# Patient Record
Sex: FEMALE | Race: WHITE | NOT HISPANIC OR LATINO | Employment: OTHER | URBAN - METROPOLITAN AREA
[De-identification: names, ages, dates, MRNs, and addresses within clinical notes are randomized per-mention and may not be internally consistent; named-entity substitution may affect disease eponyms.]

---

## 2017-01-17 ENCOUNTER — ALLSCRIPTS OFFICE VISIT (OUTPATIENT)
Dept: OTHER | Facility: OTHER | Age: 72
End: 2017-01-17

## 2017-01-17 DIAGNOSIS — G31.84 MILD COGNITIVE IMPAIRMENT: ICD-10-CM

## 2017-01-18 ENCOUNTER — TRANSCRIBE ORDERS (OUTPATIENT)
Dept: ADMINISTRATIVE | Facility: HOSPITAL | Age: 72
End: 2017-01-18

## 2017-01-18 DIAGNOSIS — G70.00 MYASTHENIA GRAVIS (HCC): Primary | ICD-10-CM

## 2017-02-08 ENCOUNTER — GENERIC CONVERSION - ENCOUNTER (OUTPATIENT)
Dept: OTHER | Facility: OTHER | Age: 72
End: 2017-02-08

## 2017-02-08 ENCOUNTER — HOSPITAL ENCOUNTER (OUTPATIENT)
Dept: NEUROLOGY | Facility: CLINIC | Age: 72
Discharge: HOME | End: 2017-02-08
Payer: MEDICARE

## 2017-02-08 DIAGNOSIS — G70.00 MYASTHENIA GRAVIS (HCC): ICD-10-CM

## 2017-02-08 PROCEDURE — 95816 EEG AWAKE AND DROWSY: CPT

## 2017-02-27 ENCOUNTER — ALLSCRIPTS OFFICE VISIT (OUTPATIENT)
Dept: OTHER | Facility: OTHER | Age: 72
End: 2017-02-27

## 2017-04-13 ENCOUNTER — ALLSCRIPTS OFFICE VISIT (OUTPATIENT)
Dept: OTHER | Facility: OTHER | Age: 72
End: 2017-04-13

## 2018-01-13 VITALS
HEIGHT: 66 IN | BODY MASS INDEX: 20.48 KG/M2 | WEIGHT: 127.44 LBS | DIASTOLIC BLOOD PRESSURE: 70 MMHG | RESPIRATION RATE: 16 BRPM | HEART RATE: 77 BPM | SYSTOLIC BLOOD PRESSURE: 126 MMHG

## 2018-01-13 NOTE — PROCEDURES
Procedures by Key Kowalski MD at 2017   1:36 PM      Author:  Key Kowalski MD Service:  Neurology Author Type:  Physician     Filed:  2017  1:44 PM Date of Service:  2017  1:36 PM Status:  Signed     :  Key Kowalski MD (Physician)            ELECTROENCEPHALOGRAM (EEG)      Patient Name:  Argenis Palencia  MRN: [id number]   :  1945 File #: IHC    Age: 66 y o  Encounter #: [id number]   Date performed: 2017            Report date: 2017          Study type: Routine EEG    ICD 10 diagnosis: Other Epilepsy G40 909    Start time: 09:44 End time: 10:16     -------------------------------------------------------------------------------------------------------------------   Patient History: This recording was observed in a 66 y o  female with history of epilepsy  to better characterize epilepsy  Medications include: Keppra    -------------------------------------------------------------------------------------------------------------------   Description of Procedure:  ·  32 channel digital recording with electrodes placed according to the International 10-20 system with additional  T1/T2 electrodes, EOG, EKG, and simultaneous  video  The recording was technically satisfactory  -------------------------------------------------------------------------------------------------------------------   EEG Description:    The recording was performed with the patient awake and drowsy  She was oriented to  self, , and president  During wakefulness, there were long runs of well regulated, low amplitude, posteriorly  dominant, symmetric 8-9 cps alpha rhythm that attenuated with eye opening  There were symmetric low amplitude, frontally dominant beta activities  There were sporadic left temporal delta activities  With drowsiness, alpha activity slowed and there were diffusely distributed theta activities  Deeper sleep was not attained  -------------------------------------------------------------------------------------------------------------------   Activation Procedures:  Hyperventilation was not performed     Stepped photic stimulation between 1-20 cps was performed and induced symmetric  photic driving  Other findings: The single lead ECG demonstrated a regular rhythm     -------------------------------------------------------------------------------------------------------------------   EEG Interpretation: This Routine EEG recorded during wakefulness and drowsiness is abnormal   Sporadic left temporal delta activities suggests an area of underlying neuronal dysfunction  Anayeli Shine MD   [id number] [facility]               Received for:Kimani TAYLOR    Feb 8 2017  1:45PM [facility] Standard Time

## 2018-01-14 VITALS
WEIGHT: 125.44 LBS | HEIGHT: 66 IN | DIASTOLIC BLOOD PRESSURE: 74 MMHG | HEART RATE: 88 BPM | RESPIRATION RATE: 16 BRPM | SYSTOLIC BLOOD PRESSURE: 155 MMHG | BODY MASS INDEX: 20.16 KG/M2

## 2018-01-15 VITALS — DIASTOLIC BLOOD PRESSURE: 66 MMHG | SYSTOLIC BLOOD PRESSURE: 134 MMHG | HEART RATE: 79 BPM | RESPIRATION RATE: 16 BRPM

## 2018-03-15 ENCOUNTER — OFFICE VISIT (OUTPATIENT)
Dept: NEUROLOGY | Facility: CLINIC | Age: 73
End: 2018-03-15
Payer: MEDICARE

## 2018-03-15 VITALS — SYSTOLIC BLOOD PRESSURE: 133 MMHG | HEART RATE: 73 BPM | DIASTOLIC BLOOD PRESSURE: 75 MMHG | RESPIRATION RATE: 14 BRPM

## 2018-03-15 DIAGNOSIS — G70.00 MYASTHENIA GRAVIS (HCC): Primary | ICD-10-CM

## 2018-03-15 PROCEDURE — 99214 OFFICE O/P EST MOD 30 MIN: CPT | Performed by: PSYCHIATRY & NEUROLOGY

## 2018-03-15 RX ORDER — THIAMINE MONONITRATE (VIT B1) 100 MG
1 TABLET ORAL DAILY
COMMUNITY
Start: 2017-02-27

## 2018-03-15 RX ORDER — ACETAMINOPHEN 650 MG/1
1 SUPPOSITORY RECTAL AS NEEDED
COMMUNITY

## 2018-03-15 RX ORDER — DIPHENHYDRAMINE HYDROCHLORIDE 25 MG/1
1 CAPSULE ORAL DAILY
COMMUNITY
Start: 2017-02-27

## 2018-03-15 RX ORDER — SODIUM PHOSPHATE,MONO-DIBASIC 19G-7G/118
ENEMA (ML) RECTAL
COMMUNITY

## 2018-03-15 NOTE — PROGRESS NOTES
Progress Note - Neurology   Fiona Eddy 74 y o  female MRN: [id number]      Assessment/Plan     Problem List Items Addressed This Visit     Myasthenia gravis [facility]) - Primary          Assessment:  Myasthenia gravis  Seizure disorder  Plan:  Myasthenia gravis:  Stable at this time  Patient has minimal symptoms  Exam appears to be normal and no ptosis on sustained upgaze  Recommend continuing with Mestinon 60 mg half tablet 3 times daily  Patient tolerating the medication well  History of seizures:  Stable patient denies any seizures at this time  Patient is following up with Dr Momo Pantoja and is on [address]  Will follow up the patient in 1 year  Reason for Follow up: For myasthenia gravis  HPI: Fiona Eddy is a 74 y o  female who is here for a follow up  Patient was last seen in April 2017 and is here for annual follow-up    Patient states that her myasthenia has been stable  She denies any ocular complaints at this time except mild ptosis occasionally towards the end of the day  She denies any diplopia or ptosis  She denies any dysphagia, shortness of breath, dysarthria  She is on Mestinon 60 mg half tablet 3 times daily  Patient is following up with Dr Momo Pantoja in regards to her seizures  She denies any further episodes of seizures and is stable on Keppra  Review of Systems   Constitutional: Positive for appetite change  Negative for fever  HENT: Positive for hearing loss  Negative for tinnitus, trouble swallowing and voice change  Eyes: Negative  Negative for photophobia and pain  Respiratory: Positive for shortness of breath  Cardiovascular: Negative  Negative for palpitations  Gastrointestinal: Negative  Negative for nausea and vomiting  Endocrine: Negative  Negative for cold intolerance and heat intolerance  Genitourinary: Negative  Negative for dysuria, frequency and urgency  Musculoskeletal: Negative  Negative for myalgias and neck pain  Skin: Negative    Negative for rash  Neurological: Positive for headaches  Negative for dizziness, tremors, seizures, syncope, facial asymmetry, speech difficulty, weakness, light-headedness and numbness  Hematological: Negative  Does not bruise/bleed easily  Psychiatric/Behavioral: Negative  Negative for confusion, hallucinations and sleep disturbance  Historical Information   Past Medical History:   Diagnosis Date    Atrial septal defect     [facility]ch[facility]) ([facility]     [facility]cerebral vascular accident) ([facility] )     Epilepsy ([place] [address] )     History of DVT (deep vein thrombosis)     Hyperlipidemia     Hyperlipidemia     Hypertension      Past Surgical History:   Procedure Laterality Date    HYSTERECTOMY      VARICOSE VEIN SURGERY       Social History   History   Alcohol Use No     History   Drug Use No     History   Smoking Status    Current Every Day Smoker    Packs/day: 0 25    Years: 40 00   Smokeless Tobacco    Never Used     No family history on file  Meds/Allergies     Current Outpatient Prescriptions:     acetaminophen (TYLENOL) 325 mg tablet, Take 650 mg by mouth every 6 (six) hours as needed for mild pain , Disp: , Rfl:     acetaminophen (TYLENOL) 650 mg suppository, Insert 1 suppository into the rectum as needed, Disp: , Rfl:     benzocaine-menthol (CEPACOL SORE THROAT) 15-3 6 mg per lozenge, Apply 1 lozenge to the mouth or throat every 4 (four) hours, Disp: , Rfl:     bisacodyl (FLEET) 10 MG/30ML ENEM, Insert 10 mg into the rectum once, Disp: , Rfl:     Difluprednate (DUREZOL) 0 05 % EMUL, Administer 1 drop to the right eye 3 (three) times a day, Disp: , Rfl:     docusate sodium (COLACE) 100 mg capsule, Take 100 mg by mouth daily  , Disp: , Rfl:     ferrous sulfate 325 (65 Fe) mg tablet, Take 325 mg by mouth daily at bedtime, Disp: , Rfl:     fluticasone-salmeterol (ADVAIR) 250-50 mcg/dose inhaler, Inhale 1 puff every 12 (twelve) hours, Disp: , Rfl:     folic acid (FOLVITE) 1 mg tablet, Take 1 mg by mouth daily, Disp: , Rfl:     levETIRAcetam (KEPPRA) 750 mg tablet, Take 750 mg by mouth daily  , Disp: , Rfl:     lisinopril (ZESTRIL) 5 mg tablet, Take 5 mg by mouth daily  , Disp: , Rfl:     magnesium hydroxide (MILK OF MAGNESIA) 800 MG/5ML suspension, Take 30 mL by mouth daily as needed for constipation, Disp: , Rfl:     Mirabegron ER (MYRBETRIQ) 25 MG TB24, Take by mouth daily  , Disp: , Rfl:     Nepafenac (ILEVRO) 0 3 % SUSP, Apply to eye, Disp: , Rfl:     pravastatin (PRAVACHOL) 10 mg tablet, Take 10 mg by mouth daily  , Disp: , Rfl:     pyridostigmine (MESTINON) 60 mg tablet, Take 30 mg by mouth 3 (three) times a day , Disp: , Rfl:     Pyridoxine HCl (VITAMIN B-6) 25 MG tablet, Take 1 tablet by mouth daily, Disp: , Rfl:     sodium phosphate (FLEET) enema, Insert 1 enema into the rectum once follow package directions, Disp: , Rfl:     Sodium Phosphates ([employer] ENEMA DISPOSABLE) 19-7 GM/118ML ENEM, Insert into the rectum, Disp: , Rfl:     thiamine (VITAMIN B1) 100 mg tablet, Take 1 tablet by mouth daily, Disp: , Rfl:     tiotropium (SPIRIVA) 18 mcg inhalation capsule, Place 18 mcg into inhaler and inhale daily  , Disp: , Rfl:     warfarin (COUMADIN) 2 mg tablet, Take 2 mg by mouth daily Patient takes 1 tablet at bedtime every Sun, Tue, Thursday, Saturday , Disp: , Rfl:     warfarin (COUMADIN) 3 mg tablet, Take 3 mg by mouth daily Patient takes 1 tablet orally at bedtime every Mon, Wed, Friday, Disp: , Rfl:     No Known Allergies    Objective   Vitals:Blood pressure 133/75, pulse 73, resp  rate 14  ,There is no height or weight on file to calculate BMI  General examination:  Patient is awake and alert  Eyes: Conjunctiva and sclera are clear  HEENT: External examination is normal  Neck: Supple  Lungs: Clear to auscultation bilaterally  CVS: S1, S2 heard  Abdomen: Soft, nontender  Extremities: No clubbing, edema, cyanosis  Skin: No rashes       Neurological examination: Mental status: Patient is awake, alert, oriented to time place and person  Attention, concentration, fund of knowledge is intact  Language: No evidence of aphasia or dysarthria  Memory: Repetition 3/3 and recall 3/3  Cranial nerves: Pupils equal, reacting to light and accommodation  Extraocular movements intact  No ptosis noted on sustained upgaze bilaterally  Visual fields are full  Fundi are difficult to visualize bilaterally  Facial sensation is intact  No facial weakness is noted  Finger rub test is intact bilaterally  Tongue and uvula are in midline  Gag is intact  Shoulder shrug is 5/5 bilaterally  Motor examination: Tone is normal  No atrophy noted  Strength is 5/5 throughout  Sensory examination: Light touch is intact  Pinprick, temperature are intact  Vibration is slightly decreased at the level of the toes bilaterally  Proprioception is intact  Deep tendon reflexes: 1 throughout  Plantars are downgoing bilaterally  Coordination: Finger-nose test and finger tapping intact bilaterally  Heel-to-shin test is intact bilaterally  Gait: Patient is in a wheelchair and gait is deferred at this time  Lab Results:   Labs in March 2018 showed INR 1 4, PT 16 5, glucose 51, creatinine 0 93, ALT 12, AST 11, LDL cholesterol 63  Imaging Studies: I have personally reviewed pertinent reports        Counseling / Coordination of Care  I spent 25 minutes with the patient and greater than 50% of the time was spent in coordination of care and counselling

## 2018-03-27 ENCOUNTER — TELEPHONE (OUTPATIENT)
Dept: NEUROLOGY | Facility: CLINIC | Age: 73
End: 2018-03-27

## 2018-03-27 NOTE — TELEPHONE ENCOUNTER
Patient's appointment was rescheduled for 7/9//18 at [address] [place] [address] in [facility]  The facility's scheduling team was offered [facility] for a sooner appointment date but was denied due to distance  They only wanted an appointment in [facility]

## 2018-03-27 NOTE — TELEPHONE ENCOUNTER
Called patient to reschedule her 4/13/18 60 minute appointment per Dr Alma Martini time  Left message advising patient to call back to reschedule

## 2018-07-09 ENCOUNTER — OFFICE VISIT (OUTPATIENT)
Dept: NEUROLOGY | Facility: CLINIC | Age: 73
End: 2018-07-09
Payer: MEDICARE

## 2018-07-09 VITALS
SYSTOLIC BLOOD PRESSURE: 124 MMHG | HEART RATE: 64 BPM | RESPIRATION RATE: 16 BRPM | HEIGHT: 66 IN | BODY MASS INDEX: 19.16 KG/M2 | WEIGHT: 119.2 LBS | DIASTOLIC BLOOD PRESSURE: 69 MMHG

## 2018-07-09 DIAGNOSIS — E51.9 THIAMINE DEFICIENCY: ICD-10-CM

## 2018-07-09 DIAGNOSIS — E44.0 PROTEIN-CALORIE MALNUTRITION, MODERATE (HCC): Chronic | ICD-10-CM

## 2018-07-09 DIAGNOSIS — G31.84 MILD COGNITIVE IMPAIRMENT WITH MEMORY LOSS: ICD-10-CM

## 2018-07-09 DIAGNOSIS — E53.1 VITAMIN B6 DEFICIENCY: ICD-10-CM

## 2018-07-09 DIAGNOSIS — H02.403 PTOSIS OF BOTH EYELIDS: ICD-10-CM

## 2018-07-09 DIAGNOSIS — G70.00 MYASTHENIA GRAVIS, ACHR ANTIBODY POSITIVE (HCC): ICD-10-CM

## 2018-07-09 DIAGNOSIS — G40.209 PARTIAL SYMPTOMATIC EPILEPSY WITH COMPLEX PARTIAL SEIZURES, NOT INTRACTABLE, WITHOUT STATUS EPILEPTICUS (HCC): Primary | ICD-10-CM

## 2018-07-09 PROCEDURE — 99215 OFFICE O/P EST HI 40 MIN: CPT | Performed by: PSYCHIATRY & NEUROLOGY

## 2018-07-09 RX ORDER — BUDESONIDE AND FORMOTEROL FUMARATE DIHYDRATE 160; 4.5 UG/1; UG/1
2 AEROSOL RESPIRATORY (INHALATION) 2 TIMES DAILY
COMMUNITY

## 2018-07-09 RX ORDER — LEVETIRACETAM 750 MG/1
750 TABLET ORAL EVERY 12 HOURS
Qty: 180 TABLET | Refills: 3 | Status: SHIPPED | OUTPATIENT
Start: 2018-07-09

## 2018-07-09 RX ORDER — IBUPROFEN 200 MG
TABLET ORAL 4 TIMES DAILY
COMMUNITY

## 2018-07-09 RX ORDER — PYRIDOSTIGMINE BROMIDE 60 MG/1
30 TABLET ORAL 3 TIMES DAILY
Qty: 135 TABLET | Refills: 3 | Status: SHIPPED | OUTPATIENT
Start: 2018-07-09

## 2018-07-09 NOTE — PROGRESS NOTES
Review of Systems    {LimROS-complete:92927}      Review of Systems   Constitutional: Positive for appetite change  HENT: Negative  Eyes: Negative  Respiratory: Negative  Cardiovascular: Negative  Gastrointestinal: Negative  Genitourinary: Negative  Musculoskeletal: Negative  Skin: Negative  Hematological: Bruises/bleeds easily  Psychiatric/Behavioral: Positive for dysphoric mood and sleep disturbance

## 2018-07-09 NOTE — PATIENT INSTRUCTIONS
PLAN:  Epilepsy  1 - continue with Levetiracetam 750mg twice a day  Given that she has been seizure free for quite some time now, should she have a seizure / convulsion she should be taken to the emergency department for immediate assessment (given the risk of intracranial hemorrhage on anticoagulation and historically difficulty keeping her INR in a specific therapeutic range)    Ocular Myasthesia Gravis  2 - continue with pyridostigmine 60mg tab take half a tab 3 times a day  3 - call the office if there is progression of double vision, ptosis (droopy eye lids), muscle fatigue at the end of the day    Memory Impairment  4 - re-enforce the use of hearing aids  5 - call the office for re-assessment if memory impairment is worse    Protein Malnutrition - vitamin deficiency  6 - please have the patient evaluated by dietician for caloric-protein needs, may need supplemental nutritional drinks  7 - continue with thiamine and B6 supplements  8 - check thiamine and B6 levels    History of prior multiple strokes  I recommend that she continue with anticoagulation and secondary stroke risk modification  Based on her blood tests she does not have diabetes as a risk factor, she is on a statin with low LDL levels, and her BP goals should be <130/90  I reviewed signs of a stroke including acute onset facial asymmetry, limb weakness or incoordination, speech deficit/difficulty, or loss of vision and she must immediately notify EMS / nursing staff  She has some degree of cognitive impairment related to her memory domain, currently stable will continue to monitor

## 2018-07-09 NOTE — PROGRESS NOTES
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  She is tolerating her levetiracetam without significant side effects  She has not had complications from her myasthenia gravis  Her most recent LDL is less than 70 and BP have been less than 130/90  She has no prior history of diabetes  She has some mild cognitive impairment, mostly related to memory domain along with poor hearing  She appears to be functioning well without progression of neurocognitive disorder      1 - Epilepsy - due to the high risk of seizure recurrence with an abnormal EEG and h/o strokes and she is on anticoagulation, I recommend that she continue levetiracetam monotherapy as she does not have any side effects from this medication, and it is less likely to interact with multiple other medications       2 - multiple strokes  I recommend that she continue with anticoagulation and secondary stroke risk modification  Based on her blood tests she does not have diabetes as a risk factor, she is on a statin with low LDL levels, and her BP goals should be <130/90  I reviewed signs of a stroke including acute onset facial asymmetry, limb weakness or incoordination, speech deficit/difficulty, or loss of vision and she must immediately notify EMS / nursing staff  She has some degree of cognitive impairment related to her memory domain, currently stable will continue to monitor      3 - Ocular myasthenia gravis is stable with low dose of Mestinon      4 - vitamin B1 and B6 deficiency, continue supplementation; but would recheck levels    Plan:   Epilepsy  1 - continue with Levetiracetam 750mg twice a day  Given that she has been seizure free for quite some time now, should she have a seizure / convulsion she should be taken to the emergency department for immediate assessment (given the risk of intracranial hemorrhage on anticoagulation and historically difficulty keeping her INR in a specific therapeutic range)    Ocular Myasthesia Gravis  2 - continue with pyridostigmine 60mg tab take half a tab 3 times a day  3 - call the office if there is progression of double vision, ptosis (droopy eye lids), muscle fatigue at the end of the day    Memory Impairment  4 - re-enforce the use of hearing aids  5 - call the office for re-assessment if memory impairment is worse    Protein Malnutrition - vitamin deficiency  6 - please have the patient evaluated by dietician for caloric-protein needs, may need supplemental nutritional drinks  7 - continue with thiamine and B6 supplements  8 - check thiamine and B6 levels    History of prior multiple strokes  I recommend that she continue with anticoagulation and secondary stroke risk modification  Based on her blood tests she does not have diabetes as a risk factor, she is on a statin with low LDL levels, and her BP goals should be <130/90  I reviewed signs of a stroke including acute onset facial asymmetry, limb weakness or incoordination, speech deficit/difficulty, or loss of vision and she must immediately notify EMS / nursing staff  She has some degree of cognitive impairment related to her memory domain, currently stable will continue to monitor        Problem List Items Addressed This Visit        Nervous and Auditory    Epilepsy ([facility] [address] ) - Primary    Relevant Medications    levETIRAcetam (KEPPRA) 750 mg tablet    Myasthenia gravis, AChR antibody positive (HCC)    Relevant Medications    pyridostigmine (MESTINON) 60 mg tablet    Mild cognitive impairment with memory loss    Relevant Orders    Vitamin B1, whole blood    Vitamin B6       Other    Protein-calorie malnutrition, moderate (HCC) (Chronic)    Relevant Orders    Vitamin B1, whole blood    Vitamin B6    Ptosis of eyelid      Other Visit Diagnoses     Thiamine deficiency        Vitamin B6 deficiency                Chief Complaint:    Chief Complaint   Patient presents with    Seizures     Patient present for neurology follow up regarding epilepsy  HPI:    Humberto Florez is a 74 y o  right handed female here for follow-up evaluation of suspected epilepsy and ocular myasthenia gravis  Interval History 7/9/2018  Ms Akhil Borrego was previously seen by Dr Lauren Barragan for ocular myasthenia gravis  She is still on Mestinon 30mg three times a day  (There was mild ptosis, but no diplopia )  She is also on thiamine and pyridoxine supplementation due to low vitamin levels or deficiencies that was detected last year  Ms Akhil Borrego denies having had recurrent episodes of disorientation, confusion, or periods of unresponsiveness  She denies recurrent double vision, droopy eyelids, difficulty chewing food, difficulty breathing, or feeling muscle weakness  She is able to walk to the bathroom  Most of the time she uses wheelchair, due to oxygen dependency  She is not aware of memory difficulty; but her staff member commented that she repeats herself frequently  I called [employer] and spoke with Sanna Estrella, the unit nurse  There have been no unusual behavior, periods of confusion, unresponsiveness, or seizure-like activity  Ms Akhil Borrego has been complaining of sacral pain, there is no open wound; she was given a different cushion for her wheelchair but she does not like to use it  She is very stubborn  There have been no complaints of forgetfulness or memory difficulty    She is very with it, she is able to recall when she received medical treatments, she is able to self care, manages her own oxygen recognizing when the oxygen tank is low  She is currently on thiamine 100mg daily and pyridoxine 25mg daily (low vitamin levels were detected last year)  AED/side effects/compliance:  Levetiracetam 750mg twice a day  Regulated  Intermittent irritability (alert and pleasant and tearful) a couple of times a week, poor relationship with roommate  Intermittent depressed mood due to complex family dynamics  Seizure semiology:  1  Staring, possibly some shaking, amnestic/feels frozen      Prior Epilepsy History:  Intake history September 2014  She believes that her first seizure occurred about 1-3 years ago when she went to see her daughter in [place] (H+P from [facility] admission suggests June 2012)  She recalled getting up to go to the bathroom and then had to hold on to the railing going down stairs, she called out for help as she was holding on to the railing, She was frozen unable to do anything, she denies loss of consciousness  She appeared to just stare out; she was not able to hear, unable to talk  She cannot remember the event  She feels that she lost about 30 minutes to 60 minutes  She was told that it may have been a TIA, they found that she has a PFO  She was given medications (unclear what for)  In July 2012, she was at her [address] [address] office when she started to have a staring event, complained of leg weakness and lethargy and then started having some shaking movements  She was sent to the ED for evaluation  She had an MRI/MRA study that showed acute infarct within the frontal lobes and the right cerebellum, along with an old left cerebellar infarct and small vessel disease  She was subsequently started on Coumadin due to the PFO and she had declined surgical closure  In January 2013, she presented to hospital with feeling weak, not herself, and fell in the bedroom    When being evaluated by neurology consult she appeared to have had a complex partial seizure (I am unable to interpret what was written on the consultation note other than the impression that it was a complex partial seizure)  She had an EEG that showed diffuse background slowing and left temporal focal slowing with sharp waves  She was started on Keppra  She had an MRI of the brain that did not show a new infarct  Her next admission was in July 2014 when she presented to the ED for a right eye ptosis  MRI/MRA did not reveal an aneurysm or new stroke  She had reported prior seizures but she does not recall the event type  She was also tested for Acetylcholine receptor antibodies which has come back positive  Unclear frequency of seizures, but she has been on levetiracetam 750mg twice a day  She is unable to tell me her last seizure  She has been in and out of [address] since 2012  There has not been any report of seizures since she has been [address]  She tells me that her right eye drooping started in 7/2014  There is no prior history of drooping of the left eye  She denies problems with chewing food, swallowing food, no late day weakness or progressive weakness of the hands/fingers  She denies experiencing episodic diplopia  She does perform light duty chores without progressive weakness  She has COPD and requires oxygen when she is ambulating  Summary [id number]  She has been seen by Dr Yimi Arias for ocular sero-positive myasthenia gravis for the past year  She was started on Mestinon 30mg three times a day in January 2016 (no evidence of thymoma, not on immunosuppressant at this time)  The last time she was seen by me was in September 2014  She denies fatigable weakness, dysarthria, dysphagia, shortness of breath, or progressive weakness towards the end of the day  She denies having had loss of consciousness, altered awareness, convulsion, psychic phenomenon  She denies depression, mood swings, or crying spells       She generally will ambulate with the support of her wheelchair (due to oxygen dependency) but without assistance        Special Features  Status epilepticus: No  Self Injury Seizures: No  Precipitating Factors: None    Epilepsy Risk Factors:  Abnormal pregnancy: No  Abnormal birth/: No  Abnormal Development: No  Febrile seizures, simple: No  Febrile seizures, complex: No  CNS infection: No  Mental retardation: No  Cerebral palsy: No  Head injury (moderate/severe): No  CNS neoplasm: No  CNS malformation: No  Neurosurgical procedure: No  Stroke: No  Alcohol abuse: No  Drug abuse: No  Family history Sz/epilepsy: No    Prior AEDs:  medication Max dose Time used Reason to stop   levetiracetam              Contacts:  Daughter Jan Greene (may have witnessed a seizure - [phone], [phone]), daughter Maryam Christy [phone] (not witness to any seizure)    Prior workup:  x   Imaging:  MRI brain 2014  Moderate chronic microangiopathic changes involve the subcortical and deep white matter of the frontal and parietal lobes bilaterally  Chronic bilateral posterior cerebellar cortical and subcortical infarcts are present  No acute ischemic disease is identified  MRI of the orbits are unremarkable  MRA  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 with distribution corresponding to current MRI exam for chronic bilateral cerebellar infarctions  No aneurysm is present    EEGs:  EEG 2013 interpreted by Dr Cordon  6-7 Hz posterior rhythm intermittent left temporal slowing rare left temporal sharp waves maximal over T3  Impression:  Mild diffuse cerebral dysfunction with left temporal structural abnormality questionable epileptiform potential    2017  Routine  Left temporal delta activity    Labs:  2017  B1 <2  B6 12 2  B12 512  Folate >17 5  SPEP normal pattern    3/2/2018  CBC 8 3/10 /309  /4 2/105/26/16/0 9/51  LFT 7 0/3 0/0 ///104  LDLc 63  Cholesterol 142  Triglyceride 44    General exam   /69 (BP Location: Right arm, Patient Position: Sitting, Cuff Size: Standard)   Pulse 64   Resp 16   Ht 5' 6" (1 676 m)   Wt 54 1 kg (119 lb 3 2 oz)   BMI 19 24 kg/m²    Appearance: she is alert and no acute distress; she has an oxygen nasal cannula  Carotids: n/a  Cardiovascular: regular rate and rhythm and normal heart sounds  Pulmonary: clear on inhalation and wheezing is present on exhaling  Extremities: no edema    HEENT: there is wasting of the temporalis muscle and inset eyes (likely due to decrease in kassandra-orbital fat pads), anicteric and moist mucus membranes / oral cavity   Fundoscopy: not assessed    Mental status  Orientation: alert and oriented to name, unable to give month, day, day of week, year, [facility], [facility] Knowledge: she is evasive with her answers (she does not pay attention to the news), but she is aware of what is happening at [place] won the superbowl; [place], unable to give much information regarding the news; Attention and Concentration: able to spell HOUSE forwards and backwards  Current and Remote Memory:recalled 0/3 words after five minutes; she has difficulty encoding 3 words for memory testing     Language: she has difficulty with repetition in part due to poor hearing, no aphasia, spontaneous speech is normal and comprehension is intact    Cranial Nerves  CN 1: not tested  CN 2: pupils equal round reactive to direct and consenual light   CN 3, 4, 6: EOMI, no nystagmus  CN 5:not assessed  CN 7:muscles of facial expression are symmetric  CN 8:patient is unable to hear finger rubs; she frequently looks to her staff and aske "what did he say" and her staff member would have to repeat what I said  CN 9, 10:no dysarthria present  CN 11:symmetric strength of sternocleidomastoid and trapezius muscles  CN 12:tongue is midline    Motor:  Bulk, Tone: normal bulk, normal tone  Pronation: no pronator drift  Strength: symmetric strength of the arms and legs; but she is sensitive tot he left arm, which is sore from an open wound; she had a bandage for blood work, but she pulled the band-aid off and it caused a skin tear  Sensory:  Lighttouch: intact in all limbs  Vibration: intact in all limbs  Romberg:normal    Coordination:  FNF:FNF bilaterally intact  DAMARIS:incoordinated finger movements of the left and incoordinated finger movemetns of the right  FFM:slow finger taps on the left and slow finger taps on the right  Gait/Station:normal gait    Reflexes:  Right knee reflex is 2+/4, left knee is 1+/4, unable to elicit ankle reflexes      Past Medical/Surgical History:  Patient Active Problem List   Diagnosis    Hypertension    Hypercholesterolemia    Epilepsy ([place] [address] )    [facility]    Atrial septal defect    History of DVT (deep vein thrombosis)    CVA (cerebral vascular accident) ([place] [address] )    Cellulitis and abscess    COPD (chronic obstructive pulmonary disease) ([facility])    Chronic respiratory failure with hypoxia ([facility])    Protein-calorie malnutrition, moderate ([facility])    Myasthenia gravis, AChR antibody positive ([facility])    Cataract    Depression    Venous insufficiency    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[facility])    Ptosis of eyelid    Patent foramen ovale    Onychomycosis of toenail    Mild cognitive impairment with memory loss    Headache    GERD (gastroesophageal reflux disease)     Past Medical History:   Diagnosis Date    Atrial septal defect     COPD (chronic obstructive pulmonary disease) ([facility])     CVA (cerebral vascular accident) ([place] [address] )     Epilepsy ([place] [address] )     History of DVT (deep vein thrombosis)     Hyperlipidemia     Hyperlipidemia     Hypertension      Past Surgical History:   Procedure Laterality Date    HYSTERECTOMY      VARICOSE VEIN SURGERY         Past Psychiatric History:  Depression: No  Anxiety: No  Psychosis: No    Medications:    Current Outpatient Prescriptions:     acetaminophen (TYLENOL) 325 mg tablet, Take 650 mg by mouth every 6 (six) hours as needed for mild pain , Disp: , Rfl:     acetaminophen (TYLENOL) 650 mg suppository, Insert 1 suppository into the rectum as needed, Disp: , Rfl:     benzocaine-menthol (CEPACOL SORE THROAT) 15-3 6 mg per lozenge, Apply 1 lozenge to the mouth or throat every 4 (four) hours, Disp: , Rfl:     bisacodyl (FLEET) 10 MG/30ML ENEM, Insert 10 mg into the rectum once, Disp: , Rfl:     budesonide-formoterol (SYMBICORT) 160-4 5 mcg/act inhaler, Inhale 2 puffs 2 (two) times a day Rinse mouth after use , Disp: , Rfl:     Difluprednate (DUREZOL) 0 05 % EMUL, Administer 1 drop to the right eye 3 (three) times a day, Disp: , Rfl:     ferrous sulfate 325 (65 Fe) mg tablet, Take 325 mg by mouth daily at bedtime, Disp: , Rfl:     fluticasone-salmeterol (ADVAIR) 250-50 mcg/dose inhaler, Inhale 1 puff every 12 (twelve) hours, Disp: , Rfl:     folic acid (FOLVITE) 1 mg tablet, Take 1 mg by mouth daily, Disp: , Rfl:     levETIRAcetam (KEPPRA) 750 mg tablet, Take 1 tablet (750 mg total) by mouth every 12 (twelve) hours, Disp: 180 tablet, Rfl: 3    lisinopril (ZESTRIL) 5 mg tablet, Take 5 mg by mouth daily  , Disp: , Rfl:     magnesium hydroxide (MILK OF MAGNESIA) 800 MG/5ML suspension, Take 30 mL by mouth daily as needed for constipation, Disp: , Rfl:     Mirabegron ER (MYRBETRIQ) 25 MG TB24, Take by mouth daily  , Disp: , Rfl:     neomycin-bacitracin-polymyxin (NEOSPORIN) 5-400-5,000 ointment, Apply topically 4 (four) times a day, Disp: , Rfl:     Nepafenac (ILEVRO) 0 3 % SUSP, Apply to eye, Disp: , Rfl:     pravastatin (PRAVACHOL) 10 mg tablet, Take 10 mg by mouth daily  , Disp: , Rfl:     pyridostigmine (MESTINON) 60 mg tablet, Take 0 5 tablets (30 mg total) by mouth 3 (three) times a day, Disp: 135 tablet, Rfl: 3    Pyridoxine HCl (VITAMIN B-6) 25 MG tablet, Take 1 tablet by mouth daily, Disp: , Rfl:     sodium phosphate (FLEET) enema, Insert 1 enema into the rectum once follow package directions, Disp: , Rfl:     Sodium Phosphates ([employer] ENEMA DISPOSABLE) 19-7 GM/118ML ENEM, Insert into the rectum, Disp: , Rfl:     thiamine (VITAMIN B1) 100 mg tablet, Take 1 tablet by mouth daily, Disp: , Rfl:     tiotropium (SPIRIVA) 18 mcg inhalation capsule, Place 18 mcg into inhaler and inhale daily  , Disp: , Rfl:     warfarin (COUMADIN) 2 mg tablet, Take 2 mg by mouth daily Patient takes 1 tablet at bedtime every Sun, Tue, Thursday, Saturday , Disp: , Rfl:     docusate sodium (COLACE) 100 mg capsule, Take 100 mg by mouth daily  , Disp: , Rfl:     warfarin (COUMADIN) 3 mg tablet, Take 3 mg by mouth daily Patient takes 1 tablet orally at bedtime every Mon, Wed, Friday, Disp: , Rfl:     Allergies:  No Known Allergies    Family history:  History reviewed  No pertinent family history  There is no family history of seizure, epilepsy or developmental delay  Social History  Living situation:  Resident of [facility]    Work:  emigrated from [facility]:  No   reports that she has been smoking  She has a 10 00 pack-year smoking history  She has never used smokeless tobacco  She reports that she does not drink alcohol or use drugs  Review of Systems  A review of at least 12 organ/systems was obtained by the medical assistant and reviewed by me, including additional positives/negatives:  Constitutional: Positive for appetite change  Hematological: Bruises/bleeds easily  Psychiatric/Behavioral: Positive for dysphoric mood and sleep disturbance  Decision making was of high-complexity due to the patient's high risk condition (seizures), psychiatric and neuropsychological comorbidities, behavioral problems, memory and cognitive problems and medication side effects  The total amount of time spent with the patient was 55 minutes  More than 50% of this time was devoted to counseling and coordination of care   Issues addressed during this clinic visit included overall management, medication counseling or monitoring (including adverse effects, side effects and risks of antiepileptic medications)     Start time: 9AM  End time: 9:55AM

## 2018-07-09 NOTE — LETTER
2018     Vivian Oneal MD  [address]  [address] [facility]    Patient: Karina Bland   YOB: 1945   Date of Visit: 2018       Dear Dr Isai Velez:    Thank you for referring Marcio Sotelo to me for evaluation  Below are my notes for this consultation  If you have questions, please do not hesitate to call me  I look forward to following your patient along with you  Sincerely,        Collin Cooks, MD        CC: No Recipients  Collin Cooks, MD  2018  6:06 PM  Sign at close encounter  [address] [facility]  Patient's Name: Karina Bland   Patient's : 1945   Visit Type: follow-up  Referring MD / PCP:  Vivian Oneal MD     Assessment:  Ms Marcio Sotelo is a 74 y  o  woman with a history of multiple embolic stroke, suspected to be due to PFO, on anticoagulation, with at least 2-3 complex partial seizures, abnormal EEG to suggest left temporal pathology to suggest localization related epilepsy, and 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  She is tolerating her levetiracetam without significant side effects  She has not had complications from her myasthenia gravis  Her most recent LDL is less than 70 and BP have been less than 130/90  She has no prior history of diabetes  She has some mild cognitive impairment, mostly related to memory domain along with poor hearing  She appears to be functioning well without progression of neurocognitive disorder      1 - Epilepsy - due to the high risk of seizure recurrence with an abnormal EEG and h/o strokes and she is on anticoagulation, I recommend that she continue levetiracetam monotherapy as she does not have any side effects from this medication, and it is less likely to interact with multiple other medications       2 - multiple strokes  I recommend that she continue with anticoagulation and secondary stroke risk modification   Based on her blood tests she does not have diabetes as a risk factor, she is on a statin with low LDL levels, and her BP goals should be <130/90  I reviewed signs of a stroke including acute onset facial asymmetry, limb weakness or incoordination, speech deficit/difficulty, or loss of vision and she must immediately notify EMS / nursing staff  She has some degree of cognitive impairment related to her memory domain, currently stable will continue to monitor      3 - Ocular myasthenia gravis is stable with low dose of Mestinon  4 - vitamin B1 and B6 deficiency, continue supplementation; but would recheck levels    Plan:   Epilepsy  1 - continue with Levetiracetam 750mg twice a day  Given that she has been seizure free for quite some time now, should she have a seizure / convulsion she should be taken to the emergency department for immediate assessment (given the risk of intracranial hemorrhage on anticoagulation and historically difficulty keeping her INR in a specific therapeutic range)    Ocular Myasthesia Gravis  2 - continue with pyridostigmine 60mg tab take half a tab 3 times a day  3 - call the office if there is progression of double vision, ptosis (droopy eye lids), muscle fatigue at the end of the day    Memory Impairment  4 - re-enforce the use of hearing aids  5 - call the office for re-assessment if memory impairment is worse    Protein Malnutrition - vitamin deficiency  6 - please have the patient evaluated by dietician for caloric-protein needs, may need supplemental nutritional drinks  7 - continue with thiamine and B6 supplements  8 - check thiamine and B6 levels    History of prior multiple strokes  I recommend that she continue with anticoagulation and secondary stroke risk modification  Based on her blood tests she does not have diabetes as a risk factor, she is on a statin with low LDL levels, and her BP goals should be <130/90   I reviewed signs of a stroke including acute onset facial asymmetry, limb weakness or incoordination, speech deficit/difficulty, or loss of vision and she must immediately notify EMS / nursing staff  She has some degree of cognitive impairment related to her memory domain, currently stable will continue to monitor  Problem List Items Addressed This Visit        Nervous and Auditory    Epilepsy ([facility] ) - Primary    Relevant Medications    levETIRAcetam (KEPPRA) 750 mg tablet    Myasthenia gravis, AChR antibody positive (HCC)    Relevant Medications    pyridostigmine (MESTINON) 60 mg tablet    Mild cognitive impairment with memory loss    Relevant Orders    Vitamin B1, whole blood    Vitamin B6       Other    Protein-calorie malnutrition, moderate (HCC) (Chronic)    Relevant Orders    Vitamin B1, whole blood    Vitamin B6    Ptosis of eyelid      Other Visit Diagnoses     Thiamine deficiency        Vitamin B6 deficiency                Chief Complaint:    Chief Complaint   Patient presents with    Seizures     Patient present for neurology follow up regarding epilepsy  HPI:    Reji Barros is a 74 y o  right handed female here for follow-up evaluation of suspected epilepsy and ocular myasthenia gravis  Interval History 7/9/2018  Ms Gideon Mckinnon was previously seen by Dr Shankar Foster for ocular myasthenia gravis  She is still on Mestinon 30mg three times a day  (There was mild ptosis, but no diplopia )  She is also on thiamine and pyridoxine supplementation due to low vitamin levels or deficiencies that was detected last year  Ms Gideon Mckinnon denies having had recurrent episodes of disorientation, confusion, or periods of unresponsiveness  She denies recurrent double vision, droopy eyelids, difficulty chewing food, difficulty breathing, or feeling muscle weakness  She is able to walk to the bathroom  Most of the time she uses wheelchair, due to oxygen dependency  She is not aware of memory difficulty; but her staff member commented that she repeats herself frequently      I called [facility] and spoke with Anjum Umana, the unit nurse  There have been no unusual behavior, periods of confusion, unresponsiveness, or seizure-like activity  Ms Gideon Mckinnon has been complaining of sacral pain, there is no open wound; she was given a different cushion for her wheelchair but she does not like to use it  She is very stubborn  There have been no complaints of forgetfulness or memory difficulty  She is very with it, she is able to recall when she received medical treatments, she is able to self care, manages her own oxygen recognizing when the oxygen tank is low  She is currently on thiamine 100mg daily and pyridoxine 25mg daily (low vitamin levels were detected last year)  AED/side effects/compliance:  Levetiracetam 750mg twice a day  Regulated  Intermittent irritability (alert and pleasant and tearful) a couple of times a week, poor relationship with roommate  Intermittent depressed mood due to complex family dynamics  Seizure semiology:  1  Staring, possibly some shaking, amnestic/feels frozen      Prior Epilepsy History:  Intake history September 2014  She believes that her first seizure occurred about 1-3 years ago when she went to see her daughter in [place] (H+P from [facility] admission suggests June 2012)  She recalled getting up to go to the bathroom and then had to hold on to the railing going down stairs, she called out for help as she was holding on to the railing, She was frozen unable to do anything, she denies loss of consciousness  She appeared to just stare out; she was not able to hear, unable to talk  She cannot remember the event  She feels that she lost about 30 minutes to 60 minutes  She was told that it may have been a TIA, they found that she has a PFO  She was given medications (unclear what for)  In July 2012, [place] [address] [place] when she started to have a staring event, complained of leg weakness and lethargy and then started having some shaking movements    She was sent to the ED for evaluation  She had an MRI/MRA study that showed acute infarct within the frontal lobes and the right cerebellum, along with an old left cerebellar infarct and small vessel disease  She was subsequently started on Coumadin due to the PFO and she had declined surgical closure  In January 2013, she presented to hospital with feeling weak, not herself, and fell in the bedroom  When being evaluated by neurology consult she appeared to have had a complex partial seizure (I am unable to interpret what was written on the consultation note other than the impression that it was a complex partial seizure)  She had an EEG that showed diffuse background slowing and left temporal focal slowing with sharp waves  She was started on Keppra  She had an MRI of the brain that did not show a new infarct  Her next admission was in July 2014 when she presented to the ED for a right eye ptosis  MRI/MRA did not reveal an aneurysm or new stroke  She had reported prior seizures but she does not recall the event type  She was also tested for Acetylcholine receptor antibodies which has come back positive  Unclear frequency of seizures, but she has been on levetiracetam 750mg twice a day  She is unable to tell me her last seizure  She has been in and out of [address] [place] [address] since 2012  There has not been any report of seizures since she has been at [address] [place] [address]  She tells me that her right eye drooping started in 7/2014  There is no prior history of drooping of the left eye  She denies problems with chewing food, swallowing food, no late day weakness or progressive weakness of the hands/fingers  She denies experiencing episodic diplopia  She does perform light duty chores without progressive weakness  She has COPD and requires oxygen when she is ambulating  Summary [id number]  She has been seen by Dr Jerrod Wilkinson for ocular sero-positive myasthenia gravis for the past year    She was started on Mestinon 30mg three times a day in 2016 (no evidence of thymoma, not on immunosuppressant at this time)  The last time she was seen by me was in 2014  She denies fatigable weakness, dysarthria, dysphagia, shortness of breath, or progressive weakness towards the end of the day  She denies having had loss of consciousness, altered awareness, convulsion, psychic phenomenon  She denies depression, mood swings, or crying spells  She generally will ambulate with the support of her wheelchair (due to oxygen dependency) but without assistance        Special Features  Status epilepticus: No  Self Injury Seizures: No  Precipitating Factors: None    Epilepsy Risk Factors:  Abnormal pregnancy: No  Abnormal birth/: No  Abnormal Development: No  Febrile seizures, simple: No  Febrile seizures, complex: No  CNS infection: No  Mental retardation: No  Cerebral palsy: No  Head injury (moderate/severe): No  CNS neoplasm: No  CNS malformation: No  Neurosurgical procedure: No  Stroke: No  Alcohol abuse: No  Drug abuse: No  Family history Sz/epilepsy: No    Prior AEDs:  medication Max dose Time used Reason to stop   levetiracetam              Contacts:  Daughter Venkatesh Layne (may have witnessed a seizure  [phone], [phone]), daughter Amanda Ch [phone] (not witness to any seizure)    Prior workup:  x   Imaging:  MRI brain 2014  Moderate chronic microangiopathic changes involve the subcortical and deep white matter of the frontal and parietal lobes bilaterally  Chronic bilateral posterior cerebellar cortical and subcortical infarcts are present  No acute ischemic disease is identified  MRI of the orbits are unremarkable  MRA  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 with distribution corresponding to current MRI exam for chronic bilateral cerebellar infarctions  No aneurysm is present    EEGs:  EEG 2013 interpreted by Dr Maddy Farrell  6-7 Hz posterior rhythm intermittent left temporal slowing rare left temporal sharp waves maximal over T3  Impression:  Mild diffuse cerebral dysfunction with left temporal structural abnormality questionable epileptiform potential    2/8/2017  Routine  Left temporal delta activity    Labs:  1/20/2017  B1 <2  B6 12 2  B12 512  Folate >17 5  SPEP normal pattern    3/2/2018  CBC 8 3/10 1/30/309  /4 2/105/26/16/0 9/51  LFT 7 0/3 0/0 5/11/12/104  LDLc 63  Cholesterol 142  Triglyceride 44    General exam   /69 (BP Location: Right arm, Patient Position: Sitting, Cuff Size: Standard)   Pulse 64   Resp 16   Ht 5' 6" (1 676 m)   Wt 54 1 kg (119 lb 3 2 oz)   BMI 19 24 kg/m²     Appearance: she is alert and no acute distress; she has an oxygen nasal cannula  Carotids: n/a  Cardiovascular: regular rate and rhythm and normal heart sounds  Pulmonary: clear on inhalation and wheezing is present on exhaling  Extremities: no edema    HEENT: there is wasting of the temporalis muscle and inset eyes (likely due to decrease in kassandra-orbital fat pads), anicteric and moist mucus membranes / oral cavity   Fundoscopy: not assessed    Mental status  Orientation: alert and oriented to name, unable to give month, day, day of week, year, [facility] of Knowledge: she is evasive with her answers (she does not pay attention to the news), but she is aware of what is happening at [place] won the superbowl; Trump, unable to give much information regarding the news; Attention and Concentration: able to spell HOUSE forwards and backwards  Current and Remote Memory:recalled 0/3 words after five minutes; she has difficulty encoding 3 words for memory testing     Language: she has difficulty with repetition in part due to poor hearing, no aphasia, spontaneous speech is normal and comprehension is intact    Cranial Nerves  CN 1: not tested  CN 2: pupils equal round reactive to direct and consenual light   CN 3, 4, 6: EOMI, no nystagmus  CN 5:not assessed  CN 7:muscles of facial expression are symmetric  CN 8:patient is unable to hear finger rubs; she frequently looks to her staff and aske "what did he say" and her staff member would have to repeat what I said  CN 9, 10:no dysarthria present  CN 11:symmetric strength of sternocleidomastoid and trapezius muscles  CN 12:tongue is midline    Motor:  Bulk, Tone: normal bulk, normal tone  Pronation: no pronator drift  Strength: symmetric strength of the arms and legs; but she is sensitive tot he left arm, which is sore from an open wound; she had a bandage for blood work, but she pulled the band-aid off and it caused a skin tear  Sensory:  Lighttouch: intact in all limbs  Vibration: intact in all limbs  Romberg:normal    Coordination:  FNF:FNF bilaterally intact  DAMARIS:incoordinated finger movements of the left and incoordinated finger movemetns of the right  FFM:slow finger taps on the left and slow finger taps on the right  Gait/Station:normal gait    Reflexes:  Right knee reflex is 2+/4, left knee is 1+/4, unable to elicit ankle reflexes      Past Medical/Surgical History:  Patient Active Problem List   Diagnosis    Hypertension    Hypercholesterolemia    [facility]    COPD (chronic obstructive pulmonary disease) ([facility] )    Atrial septal defect    History of DVT (deep vein thrombosis)    [facility]cere[facility]) [facility]    Cellulitis and abscess    COPD [facility]chronic obstructive pulmonary disease) ([facility])    Chronic respiratory failure with hypoxia ([facility])    Protein-calorie malnutrition, moderate ([facility])    Myasthenia gravis, AChR antibody positive ([facility])    Cataract    Depression    Venous insufficiency    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[facility])    Ptosis of eyelid    Patent foramen ovale    Onychomycosis of toenail    Mild cognitive impairment with memory loss    Headache    GERD (gastroesophageal reflux disease)     Past Medical History:   Diagnosis Date    Atrial septal defect     COPD [facility]chronic obstructive pulmonary disease) ([facility] )     CVA (cerebral vascular accident) ([facility] )     Epilepsy ([facility] )     History of DVT (deep vein thrombosis)     Hyperlipidemia     Hyperlipidemia     Hypertension      Past Surgical History:   Procedure Laterality Date    HYSTERECTOMY      VARICOSE VEIN SURGERY         Past Psychiatric History:  Depression: No  Anxiety: No  Psychosis: No    Medications:    Current Outpatient Prescriptions:     acetaminophen (TYLENOL) 325 mg tablet, Take 650 mg by mouth every 6 (six) hours as needed for mild pain , Disp: , Rfl:     acetaminophen (TYLENOL) 650 mg suppository, Insert 1 suppository into the rectum as needed, Disp: , Rfl:     benzocaine-menthol (CEPACOL SORE THROAT) 15-3 6 mg per lozenge, Apply 1 lozenge to the mouth or throat every 4 (four) hours, Disp: , Rfl:     bisacodyl (FLEET) 10 MG/30ML ENEM, Insert 10 mg into the rectum once, Disp: , Rfl:     budesonide-formoterol (SYMBICORT) 160-4 5 mcg/act inhaler, Inhale 2 puffs 2 (two) times a day Rinse mouth after use , Disp: , Rfl:     Difluprednate (DUREZOL) 0 05 % EMUL, Administer 1 drop to the right eye 3 (three) times a day, Disp: , Rfl:     ferrous sulfate 325 (65 Fe) mg tablet, Take 325 mg by mouth daily at bedtime, Disp: , Rfl:     fluticasone-salmeterol (ADVAIR) 250-50 mcg/dose inhaler, Inhale 1 puff every 12 (twelve) hours, Disp: , Rfl:     folic acid (FOLVITE) 1 mg tablet, Take 1 mg by mouth daily, Disp: , Rfl:     levETIRAcetam (KEPPRA) 750 mg tablet, Take 1 tablet (750 mg total) by mouth every 12 (twelve) hours, Disp: 180 tablet, Rfl: 3    lisinopril (ZESTRIL) 5 mg tablet, Take 5 mg by mouth daily  , Disp: , Rfl:     magnesium hydroxide (MILK OF MAGNESIA) 800 MG/5ML suspension, Take 30 mL by mouth daily as needed for constipation, Disp: , Rfl:     Mirabegron ER (MYRBETRIQ) 25 MG TB24, Take by mouth daily  , Disp: , Rfl:     neomycin-bacitracin-polymyxin (NEOSPORIN) 5-400-5,000 ointment, Apply topically 4 (four) times a day, Disp: , Rfl:     Nepafenac (ILEVRO) 0 3 % SUSP, Apply to eye, Disp: , Rfl:     pravastatin (PRAVACHOL) 10 mg tablet, Take 10 mg by mouth daily  , Disp: , Rfl:     pyridostigmine (MESTINON) 60 mg tablet, Take 0 5 tablets (30 mg total) by mouth 3 (three) times a day, Disp: 135 tablet, Rfl: 3    Pyridoxine HCl (VITAMIN B-6) 25 MG tablet, Take 1 tablet by mouth daily, Disp: , Rfl:     sodium phosphate (FLEET) enema, Insert 1 enema into the rectum once follow package directions, Disp: , Rfl:     Sodium Phosphates ([employer] ENEMA DISPOSABLE) 19-7 GM/118ML ENEM, Insert into the rectum, Disp: , Rfl:     thiamine (VITAMIN B1) 100 mg tablet, Take 1 tablet by mouth daily, Disp: , Rfl:     tiotropium (SPIRIVA) 18 mcg inhalation capsule, Place 18 mcg into inhaler and inhale daily  , Disp: , Rfl:     warfarin (COUMADIN) 2 mg tablet, Take 2 mg by mouth daily Patient takes 1 tablet at bedtime every Sun, Tue, Thursday, Saturday , Disp: , Rfl:     docusate sodium (COLACE) 100 mg capsule, Take 100 mg by mouth daily  , Disp: , Rfl:     warfarin (COUMADIN) 3 mg tablet, Take 3 mg by mouth daily Patient takes 1 tablet orally at bedtime every Mon, Wed, Friday, Disp: , Rfl:     Allergies:  No Known Allergies    Family history:  History reviewed  No pertinent family history  There is no family history of seizure, epilepsy or developmental delay  Social History  Living situation:  Resident of [facility]    Work:  emigrated from [facility]:  No   reports that she has been smoking  She has a 10 00 pack-year smoking history  She has never used smokeless tobacco  She reports that she does not drink alcohol or use drugs  Review of Systems  A review of at least 12 organ/systems was obtained by the medical assistant and reviewed by me, including additional positives/negatives:  Constitutional: Positive for appetite change  Hematological: Bruises/bleeds easily     Psychiatric/Behavioral: Positive for dysphoric mood and sleep disturbance  Decision making was of high-complexity due to the patient's high risk condition (seizures), psychiatric and neuropsychological comorbidities, behavioral problems, memory and cognitive problems and medication side effects  The total amount of time spent with the patient was 55 minutes  More than 50% of this time was devoted to counseling and coordination of care  Issues addressed during this clinic visit included overall management, medication counseling or monitoring (including adverse effects, side effects and risks of antiepileptic medications)     Start time: 9AM  End time: 9:55AM

## 2018-07-27 ENCOUNTER — TELEPHONE (OUTPATIENT)
Dept: NEUROLOGY | Facility: CLINIC | Age: 73
End: 2018-07-27

## 2018-07-27 NOTE — TELEPHONE ENCOUNTER
----- Message from River Quinones MD sent at 7/26/2018  5:50 PM EDT -----  Vitamin B6 is excessively high (174 nmol/mL)  Vitamin B1 is also high 105nmol/mL    High B6 levels can cause neuropathies so I would recommend that she discontinue B6 (pyridoxine) supplementation  She may continue with B1 supplementation or change to a multivitamin  There is generally no toxicity associated with Thiamine (B1)

## 2018-07-27 NOTE — TELEPHONE ENCOUNTER
I called and spoke to pt's nursing home and advised tram of the below, he will d/c pt's B6 and will have B1 continued

## 2018-07-29 ENCOUNTER — APPOINTMENT (EMERGENCY)
Dept: RADIOLOGY | Facility: HOSPITAL | Age: 73
DRG: 871 | End: 2018-07-29
Payer: MEDICARE

## 2018-07-29 ENCOUNTER — HOSPITAL ENCOUNTER (INPATIENT)
Facility: HOSPITAL | Age: 73
LOS: 3 days | Discharge: NURSING, REHAB OR LONG-TERM CARE | DRG: 871 | End: 2018-08-01
Attending: EMERGENCY MEDICINE | Admitting: INTERNAL MEDICINE
Payer: MEDICARE

## 2018-07-29 DIAGNOSIS — E86.0 DEHYDRATION: ICD-10-CM

## 2018-07-29 DIAGNOSIS — J18.9 LLL PNEUMONIA: Primary | ICD-10-CM

## 2018-07-29 DIAGNOSIS — J18.9 HCAP (HEALTHCARE-ASSOCIATED PNEUMONIA): ICD-10-CM

## 2018-07-29 DIAGNOSIS — R00.0 TACHYCARDIA: ICD-10-CM

## 2018-07-29 DIAGNOSIS — R06.00 DYSPNEA: ICD-10-CM

## 2018-07-29 PROBLEM — Z79.01 CHRONIC ANTICOAGULATION: Status: ACTIVE | Noted: 2018-07-29

## 2018-07-29 PROBLEM — A41.9 SEPSIS DUE TO PNEUMONIA (HCC): Status: ACTIVE | Noted: 2018-07-29

## 2018-07-29 PROBLEM — A41.9 SEPSIS DUE TO PNEUMONIA (HCC): Chronic | Status: ACTIVE | Noted: 2018-07-29

## 2018-07-29 LAB
ALBUMIN SERPL BCP-MCNC: 2.6 G/DL (ref 3.5–5)
ALP SERPL-CCNC: 253 U/L (ref 46–116)
ALT SERPL W P-5'-P-CCNC: 43 U/L (ref 12–78)
ANION GAP SERPL CALCULATED.3IONS-SCNC: 11 MMOL/L (ref 4–13)
APTT PPP: 82 SECONDS (ref 24–36)
AST SERPL W P-5'-P-CCNC: 34 U/L (ref 5–45)
BACTERIA UR QL AUTO: ABNORMAL /HPF
BACTERIA UR QL AUTO: ABNORMAL /HPF
BASOPHILS # BLD AUTO: 0.06 THOUSANDS/ΜL (ref 0–0.1)
BASOPHILS NFR BLD AUTO: 0 % (ref 0–1)
BILIRUB SERPL-MCNC: 0.65 MG/DL (ref 0.2–1)
BILIRUB UR QL STRIP: NEGATIVE
BILIRUB UR QL STRIP: NEGATIVE
BUN SERPL-MCNC: 16 MG/DL (ref 5–25)
CALCIUM SERPL-MCNC: 9.2 MG/DL (ref 8.3–10.1)
CHLORIDE SERPL-SCNC: 97 MMOL/L (ref 100–108)
CLARITY UR: CLEAR
CLARITY UR: CLEAR
CO2 SERPL-SCNC: 23 MMOL/L (ref 21–32)
COLOR UR: YELLOW
COLOR UR: YELLOW
CREAT SERPL-MCNC: 1.08 MG/DL (ref 0.6–1.3)
EOSINOPHIL # BLD AUTO: 0.02 THOUSAND/ΜL (ref 0–0.61)
EOSINOPHIL NFR BLD AUTO: 0 % (ref 0–6)
ERYTHROCYTE [DISTWIDTH] IN BLOOD BY AUTOMATED COUNT: 13.5 % (ref 11.6–15.1)
GFR SERPL CREATININE-BSD FRML MDRD: 50 ML/MIN/1.73SQ M
GLUCOSE SERPL-MCNC: 102 MG/DL (ref 65–140)
GLUCOSE UR STRIP-MCNC: NEGATIVE MG/DL
GLUCOSE UR STRIP-MCNC: NEGATIVE MG/DL
HCT VFR BLD AUTO: 30 % (ref 34.8–46.1)
HGB BLD-MCNC: 9.8 G/DL (ref 11.5–15.4)
HGB UR QL STRIP.AUTO: ABNORMAL
HGB UR QL STRIP.AUTO: ABNORMAL
INR PPP: 4.02 (ref 0.86–1.17)
KETONES UR STRIP-MCNC: NEGATIVE MG/DL
KETONES UR STRIP-MCNC: NEGATIVE MG/DL
L PNEUMO1 AG UR QL IA.RAPID: NEGATIVE
LACTATE SERPL-SCNC: 1.2 MMOL/L (ref 0.5–2)
LEUKOCYTE ESTERASE UR QL STRIP: NEGATIVE
LEUKOCYTE ESTERASE UR QL STRIP: NEGATIVE
LYMPHOCYTES # BLD AUTO: 1.72 THOUSANDS/ΜL (ref 0.6–4.47)
LYMPHOCYTES NFR BLD AUTO: 9 % (ref 14–44)
MCH RBC QN AUTO: 30.2 PG (ref 26.8–34.3)
MCHC RBC AUTO-ENTMCNC: 32.7 G/DL (ref 31.4–37.4)
MCV RBC AUTO: 93 FL (ref 82–98)
MONOCYTES # BLD AUTO: 1.7 THOUSAND/ΜL (ref 0.17–1.22)
MONOCYTES NFR BLD AUTO: 9 % (ref 4–12)
NEUTROPHILS # BLD AUTO: 16.25 THOUSANDS/ΜL (ref 1.85–7.62)
NEUTS SEG NFR BLD AUTO: 82 % (ref 43–75)
NITRITE UR QL STRIP: POSITIVE
NITRITE UR QL STRIP: POSITIVE
NON-SQ EPI CELLS URNS QL MICRO: ABNORMAL /HPF
NON-SQ EPI CELLS URNS QL MICRO: ABNORMAL /HPF
NRBC BLD AUTO-RTO: 0 /100 WBCS
PH UR STRIP.AUTO: 6 [PH] (ref 4.5–8)
PH UR STRIP.AUTO: 6 [PH] (ref 4.5–8)
PLATELET # BLD AUTO: 574 THOUSANDS/UL (ref 149–390)
PMV BLD AUTO: 9.7 FL (ref 8.9–12.7)
POTASSIUM SERPL-SCNC: 4.1 MMOL/L (ref 3.5–5.3)
PROT SERPL-MCNC: 7.7 G/DL (ref 6.4–8.2)
PROT UR STRIP-MCNC: NEGATIVE MG/DL
PROT UR STRIP-MCNC: NEGATIVE MG/DL
PROTHROMBIN TIME: 39.1 SECONDS (ref 11.8–14.2)
RBC # BLD AUTO: 3.24 MILLION/UL (ref 3.81–5.12)
RBC #/AREA URNS AUTO: ABNORMAL /HPF
RBC #/AREA URNS AUTO: ABNORMAL /HPF
S PNEUM AG UR QL: NEGATIVE
SODIUM SERPL-SCNC: 131 MMOL/L (ref 136–145)
SP GR UR STRIP.AUTO: <=1.005 (ref 1–1.03)
SP GR UR STRIP.AUTO: <=1.005 (ref 1–1.03)
TROPONIN I SERPL-MCNC: <0.02 NG/ML
UROBILINOGEN UR QL STRIP.AUTO: 0.2 E.U./DL
UROBILINOGEN UR QL STRIP.AUTO: 0.2 E.U./DL
WBC # BLD AUTO: 19.75 THOUSAND/UL (ref 4.31–10.16)
WBC #/AREA URNS AUTO: ABNORMAL /HPF
WBC #/AREA URNS AUTO: ABNORMAL /HPF

## 2018-07-29 PROCEDURE — 81001 URINALYSIS AUTO W/SCOPE: CPT | Performed by: INTERNAL MEDICINE

## 2018-07-29 PROCEDURE — 93005 ELECTROCARDIOGRAM TRACING: CPT

## 2018-07-29 PROCEDURE — 85610 PROTHROMBIN TIME: CPT | Performed by: EMERGENCY MEDICINE

## 2018-07-29 PROCEDURE — 81001 URINALYSIS AUTO W/SCOPE: CPT | Performed by: EMERGENCY MEDICINE

## 2018-07-29 PROCEDURE — 87040 BLOOD CULTURE FOR BACTERIA: CPT | Performed by: EMERGENCY MEDICINE

## 2018-07-29 PROCEDURE — 87449 NOS EACH ORGANISM AG IA: CPT | Performed by: INTERNAL MEDICINE

## 2018-07-29 PROCEDURE — 94640 AIRWAY INHALATION TREATMENT: CPT

## 2018-07-29 PROCEDURE — 71046 X-RAY EXAM CHEST 2 VIEWS: CPT

## 2018-07-29 PROCEDURE — 96365 THER/PROPH/DIAG IV INF INIT: CPT

## 2018-07-29 PROCEDURE — 83605 ASSAY OF LACTIC ACID: CPT | Performed by: EMERGENCY MEDICINE

## 2018-07-29 PROCEDURE — 99285 EMERGENCY DEPT VISIT HI MDM: CPT

## 2018-07-29 PROCEDURE — 36415 COLL VENOUS BLD VENIPUNCTURE: CPT | Performed by: EMERGENCY MEDICINE

## 2018-07-29 PROCEDURE — 85025 COMPLETE CBC W/AUTO DIFF WBC: CPT | Performed by: EMERGENCY MEDICINE

## 2018-07-29 PROCEDURE — 87081 CULTURE SCREEN ONLY: CPT | Performed by: INTERNAL MEDICINE

## 2018-07-29 PROCEDURE — 99223 1ST HOSP IP/OBS HIGH 75: CPT | Performed by: INTERNAL MEDICINE

## 2018-07-29 PROCEDURE — 87147 CULTURE TYPE IMMUNOLOGIC: CPT | Performed by: INTERNAL MEDICINE

## 2018-07-29 PROCEDURE — 80053 COMPREHEN METABOLIC PANEL: CPT | Performed by: EMERGENCY MEDICINE

## 2018-07-29 PROCEDURE — 84484 ASSAY OF TROPONIN QUANT: CPT | Performed by: EMERGENCY MEDICINE

## 2018-07-29 PROCEDURE — 85730 THROMBOPLASTIN TIME PARTIAL: CPT | Performed by: EMERGENCY MEDICINE

## 2018-07-29 RX ORDER — BUDESONIDE AND FORMOTEROL FUMARATE DIHYDRATE 160; 4.5 UG/1; UG/1
2 AEROSOL RESPIRATORY (INHALATION) 2 TIMES DAILY
Status: DISCONTINUED | OUTPATIENT
Start: 2018-07-29 | End: 2018-08-01 | Stop reason: HOSPADM

## 2018-07-29 RX ORDER — LEVALBUTEROL INHALATION SOLUTION 0.63 MG/3ML
0.63 SOLUTION RESPIRATORY (INHALATION) EVERY 6 HOURS PRN
Status: DISCONTINUED | OUTPATIENT
Start: 2018-07-29 | End: 2018-07-31

## 2018-07-29 RX ORDER — PYRIDOXINE HCL (VITAMIN B6) 50 MG
25 TABLET ORAL DAILY
Status: DISCONTINUED | OUTPATIENT
Start: 2018-07-30 | End: 2018-08-01 | Stop reason: HOSPADM

## 2018-07-29 RX ORDER — POLYETHYLENE GLYCOL 3350 17 G/17G
17 POWDER, FOR SOLUTION ORAL DAILY PRN
Status: DISCONTINUED | OUTPATIENT
Start: 2018-07-29 | End: 2018-08-01 | Stop reason: HOSPADM

## 2018-07-29 RX ORDER — PYRIDOSTIGMINE BROMIDE 60 MG/1
30 TABLET ORAL 3 TIMES DAILY
Status: DISCONTINUED | OUTPATIENT
Start: 2018-07-29 | End: 2018-08-01 | Stop reason: HOSPADM

## 2018-07-29 RX ORDER — LISINOPRIL 5 MG/1
5 TABLET ORAL DAILY
Status: DISCONTINUED | OUTPATIENT
Start: 2018-07-30 | End: 2018-08-01 | Stop reason: HOSPADM

## 2018-07-29 RX ORDER — BENZONATATE 100 MG/1
100 CAPSULE ORAL 3 TIMES DAILY PRN
Status: DISCONTINUED | OUTPATIENT
Start: 2018-07-29 | End: 2018-08-01 | Stop reason: HOSPADM

## 2018-07-29 RX ORDER — PRAVASTATIN SODIUM 10 MG
10 TABLET ORAL
Status: DISCONTINUED | OUTPATIENT
Start: 2018-07-29 | End: 2018-08-01 | Stop reason: HOSPADM

## 2018-07-29 RX ORDER — GUAIFENESIN 100 MG/5ML
200 SOLUTION ORAL EVERY 4 HOURS PRN
Status: DISCONTINUED | OUTPATIENT
Start: 2018-07-29 | End: 2018-08-01 | Stop reason: HOSPADM

## 2018-07-29 RX ORDER — SODIUM CHLORIDE 9 MG/ML
125 INJECTION, SOLUTION INTRAVENOUS CONTINUOUS
Status: DISCONTINUED | OUTPATIENT
Start: 2018-07-29 | End: 2018-07-31

## 2018-07-29 RX ORDER — THIAMINE MONONITRATE (VIT B1) 100 MG
100 TABLET ORAL DAILY
Status: DISCONTINUED | OUTPATIENT
Start: 2018-07-30 | End: 2018-08-01 | Stop reason: HOSPADM

## 2018-07-29 RX ORDER — OXYBUTYNIN CHLORIDE 5 MG/1
5 TABLET, EXTENDED RELEASE ORAL DAILY
Status: DISCONTINUED | OUTPATIENT
Start: 2018-07-30 | End: 2018-08-01 | Stop reason: HOSPADM

## 2018-07-29 RX ORDER — ALBUTEROL SULFATE 2.5 MG/3ML
5 SOLUTION RESPIRATORY (INHALATION) ONCE
Status: COMPLETED | OUTPATIENT
Start: 2018-07-29 | End: 2018-07-29

## 2018-07-29 RX ORDER — ALBUTEROL SULFATE 2.5 MG/3ML
2.5 SOLUTION RESPIRATORY (INHALATION) EVERY 6 HOURS PRN
COMMUNITY

## 2018-07-29 RX ORDER — ACETAMINOPHEN 325 MG/1
650 TABLET ORAL EVERY 6 HOURS PRN
Status: DISCONTINUED | OUTPATIENT
Start: 2018-07-29 | End: 2018-08-01 | Stop reason: HOSPADM

## 2018-07-29 RX ORDER — FOLIC ACID 1 MG/1
1 TABLET ORAL DAILY
Status: DISCONTINUED | OUTPATIENT
Start: 2018-07-30 | End: 2018-08-01 | Stop reason: HOSPADM

## 2018-07-29 RX ADMIN — ALBUTEROL SULFATE 5 MG: 2.5 SOLUTION RESPIRATORY (INHALATION) at 13:13

## 2018-07-29 RX ADMIN — BUDESONIDE AND FORMOTEROL FUMARATE DIHYDRATE 2 PUFF: 160; 4.5 AEROSOL RESPIRATORY (INHALATION) at 17:19

## 2018-07-29 RX ADMIN — SODIUM CHLORIDE 125 ML/HR: 0.9 INJECTION, SOLUTION INTRAVENOUS at 23:34

## 2018-07-29 RX ADMIN — PRAVASTATIN SODIUM 10 MG: 10 TABLET ORAL at 17:09

## 2018-07-29 RX ADMIN — SODIUM CHLORIDE 125 ML/HR: 0.9 INJECTION, SOLUTION INTRAVENOUS at 15:26

## 2018-07-29 RX ADMIN — PYRIDOSTIGMINE BROMIDE 30 MG: 60 TABLET ORAL at 22:16

## 2018-07-29 RX ADMIN — IPRATROPIUM BROMIDE 0.5 MG: 0.5 SOLUTION RESPIRATORY (INHALATION) at 13:13

## 2018-07-29 RX ADMIN — PYRIDOSTIGMINE BROMIDE 30 MG: 60 TABLET ORAL at 17:19

## 2018-07-29 RX ADMIN — CEFEPIME HYDROCHLORIDE 1000 MG: 1 INJECTION, SOLUTION INTRAVENOUS at 13:20

## 2018-07-29 RX ADMIN — VANCOMYCIN HYDROCHLORIDE 750 MG: 750 INJECTION, SOLUTION INTRAVENOUS at 14:03

## 2018-07-29 RX ADMIN — LEVETIRACETAM 750 MG: 500 TABLET ORAL at 17:09

## 2018-07-29 RX ADMIN — SODIUM CHLORIDE 1500 ML: 0.9 INJECTION, SOLUTION INTRAVENOUS at 13:14

## 2018-07-29 NOTE — SEPSIS NOTE
Sepsis Note   Marta Grant 74 y o  female MRN: [id number]  Unit/Bed#: ED 20 Encounter: [id number]            Initial Sepsis Screening     Row Name 07/29/18 1314                Is the patient's history suggestive of a new or worsening infection? (!)  Yes (Proceed)  -VB        Suspected source of infection pneumonia  -VB        Are two or more of the following signs & symptoms of infection both present and new to the patient? (!)  Yes (Proceed)  -VB        Indicate SIRS criteria Altered mental status; Tachycardia > 90 bpm;Leukocytosis (WBC > 96977 IJL); Tachypnea > 20 resp per min  -VB        If the answer is yes to both questions, suspicion of sepsis is present          If severe sepsis is present AND tissue hypoperfusion perists in the hour after fluid resuscitation or lactate > 4, the patient meets criteria for SEPTIC SHOCK          Are any of the following organ dysfunction criteria present within 6 hours of suspected infection and SIRS criteria that are NOT considered to be chronic conditions? No  -VB        Organ dysfunction          Date of presentation of severe sepsis          Time of presentation of severe sepsis          Tissue hypoperfusion persists in the hour after crystalloid fluid administration, evidenced, by either:          Was hypotension present within one hour of the conclusion of crystalloid fluid administration?           Date of presentation of septic shock          Time of presentation of septic shock            User Key  (r) = Recorded By, (t) = Taken By, (c) = Cosigned By    [address] Name Provider Nhi Turner MD Physician

## 2018-07-29 NOTE — ED PROVIDER NOTES
Final Diagnosis:  1  LLL pneumonia ([address] )    2  Tachycardia    3  Dehydration    4  Dyspnea      Chief Complaint   Patient presents with    Shortness of Breath     Pt brought by EMS from [facility] for fever and respiratory difficulty  Pt has hx of COPD  Pt given duo neb and 125 mg solumedrol by EMS  Pt diagnosed with, "Left lower lobe infiltrate and was given her first dose of levaquin yesterday " Pt arrives tachypnic  Pt has COPD and where's 3L/min NC  This is a 74year old female presenting for evaluation of dyspnea, likely PNA  The patient can't give me reliable history though I don't see dementia on her previous diagnoses  Per EMS/patient/nursing home, she was well until recently when she had fever (Tmax 101f today prior to coming in) as well as dyspnea/ARF  She was started on abx yesterday (Levaquin x1 dose)  Today because of worsening symptoms, EMS was called who brought her in for evaluation  No fever noted here  I went to evaluate the patient immediately on arrival given dyspnea and ill appearance    A: intact  B: present but decreased  C: 2+ pulses, tachycardic, appears dry  D: awake, alert, knows she is in hospital, but not giving reliable answers during my interaction  E: feels febrile; awaiting rectal temperature  PMH:  - multiple embolic stroke suspected to be due to PFO on anticoagulation  - epilepsy / complex partial seizures / abnormal EEG to suggest left temporal pathology  - 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 --> low dose mestinon  - mild cognitive impairment    - COPD  - HTN  - HLD  - ASD/PFO  - HLD  PMH:  - hysterectomy  - varicose vein surgery  Smokes daily  No alcohol/drugs   PE:   Vitals:    07/29/18 1227 07/29/18 1310   BP: 109/61 109/58   BP Location: Left arm Left arm   Pulse: (!) 123 (!) 109   Resp: (!) 38 (!) 30   Temp: 99 2 °F (37 3 °C)    TempSrc: Oral    SpO2: 95% 93%   Weight: 56 5 kg (124 lb 8 oz)    General: VSS, NAD, awake, alert  Well-nourished, well-developed  Appears stated age  Head: Normocephalic, atraumatic, nontender  Eyes: PERRL, EOM-I  No diplopia  No hyphema  No subconjunctival hemorrhages  Symmetrical lids  ENT: Atraumatic external nose and ears  Dry MM  No malocclusion  No stridor  Normal phonation  No drooling  Normal swallowing  Base of mouth is soft  No mastoid tenderness  Neck: Symmetric, trachea midline  No JVD  CV: tachycardic (-130 when I'm in the room)  +S1/S2  No murmurs or gallops  Peripheral pulses +2 throughout  No chest wall tenderness  Lungs:   Labored   No retractions  Decreased bilateral air movement  No crepitus  +tachypnea  No paradoxical motion  Abd: +BS, soft, NT/ND    MSK:   Extremities without tenderness or gross deformity  FROM   No lower extremity edema but sensitive to touch on the feet  Back:   No CVAT  Skin: Dry, intact  Neuro: AAOx3, GCS 15, CN II-XII grossly intact  Motor grossly intact  Psychiatric/Behavioral: Appropriate mood and affect   Exam: deferred  A:  - Dyspnea  - tachypnea  - feels warm to touch  - dehydration  P:  - Labs / septic w/u  - fluids  - abx  - cultures  - likely admit   - BIPAP PRN  - 13 point ROS was performed and all are normal unless stated in the history above  - Nursing note reviewed  Vitals reviewed  - Orders placed by myself and/or advanced practitioner / resident     - Previous chart was reviewed  - No language barrier    - History obtained from patient  - There are no limitations to the history obtained  - Critical care time: Not applicable for this patient  ED Course as of Jul 29 1348   Sun Jul 29, 2018   1253 WBC: (!) 19 75   1306 INR: (!) 4 02   1306 LACTIC ACID: 1 2   1314 Yandy Candelario / INPT  Paged       200 Pneumonia Sevetity Index = [125]  [75] Age  [-10] Female = (-10) / Male = (0)  [10] Nursing home resident (+10)  [0] Neoplastic disease (+30)  [0] Liver disease (+20)  [0] CHF History (+10)  [10] CVA Hx (+10)  [0] Renal disease (+10)  [20] AMS (+20)  [20] RR > 29 (+45)  [6] Systolic < 90 (+99)  [1] Temp < 35 OR Temp > 39 9 (103 8F) (+15)  [0] HR > 124 (+10)  [0] pH < 7 35 (+30)  [0] BUN > 29 (+20)  [0] Na < 130 (+20)  [10] Glucose > 249 (+10)  [0] Hematocrit < 30% (+10)  [0] PaO2 < 60 (+10)  [0] Pleural effusion on CXR (+10)     = Risk Class IV, (MODERATE) 8 2-9 3% mortality  Hospitalization recommended based on risk  Medications   sodium chloride 0 9 % bolus 1,500 mL (1,500 mL Intravenous New Bag 7/29/18 1314)   vancomycin (VANCOCIN) IVPB (premix) 750 mg (750 mg Intravenous Not Given 7/29/18 1320)   cefepime (MAXIPIME) IVPB (premix) 1,000 mg (1,000 mg Intravenous New Bag 7/29/18 1320)    EMS REPLENISHMENT MED ( Does not apply Given to EMS 7/29/18 1227)   albuterol inhalation solution 5 mg (5 mg Nebulization Given 7/29/18 1313)   ipratropium (ATROVENT) 0 02 % inhalation solution 0 5 mg (0 5 mg Nebulization Given 7/29/18 1313)     X-ray chest 2 views   ED Interpretation   Abnormal   The CXR was ordered by me and interpreted by me independently  On my read, it appears:   - LLL PNA definitely   - RML patchiness   maybe multifocal then?    - she has enough risk factors for aspiration        Orders Placed This Encounter   Procedures    Blood culture #1    Blood culture #2    X-ray chest 2 views    APTT    Protime-INR    CBC and differential    Comprehensive metabolic panel    Lactic Acid x2    UA w Reflex to Microscopic w Reflex to Culture    Troponin I    Insert and maintain peripheral IV x 2 (18 gauge or >)    Continuous cardiac monitoring    Notify physician if BP < 90    ECG 12 lead    Inpatient Admission (expected length of stay for this patient is greater than two midnights)     Labs Reviewed   APTT - Abnormal        Result Value Ref Range Status    PTT 82 (*) 24 - 36 seconds Final    Comment: Therapeutic Heparin Range =  60-90 seconds   PROTIME-INR - Abnormal     Protime 39 1 (*) 11 8 - 14 2 seconds Final    INR 4 02 (*) 0 86 - 1 17 Final   CBC AND DIFFERENTIAL - Abnormal     WBC 19 75 (*) 4 31 - 10 16 Thousand/uL Final    RBC 3 24 (*) 3 81 - 5 12 Million/uL Final    Hemoglobin 9 8 (*) 11 5 - 15 4 g/dL Final    Hematocrit 30 0 (*) 34 8 - 46 1 % Final    MCV 93  82 - 98 fL Final    MCH 30 2  26 8 - 34 3 pg Final    MCHC 32 7  31 4 - 37 4 g/dL Final    RDW 13 5  11 6 - 15 1 % Final    MPV 9 7  8 9 - 12 7 fL Final    Platelets 643 (*) 211 - 390 Thousands/uL Final    nRBC 0  /100 WBCs Final    Neutrophils Relative 82 (*) 43 - 75 % Final    Lymphocytes Relative 9 (*) 14 - 44 % Final    Monocytes Relative 9  4 - 12 % Final    Eosinophils Relative 0  0 - 6 % Final    Basophils Relative 0  0 - 1 % Final    Neutrophils Absolute 16 25 (*) 1 85 - 7 62 Thousands/µL Final    Lymphocytes Absolute 1 72  0 60 - 4 47 Thousands/µL Final    Monocytes Absolute 1 70 (*) 0 17 - 1 22 Thousand/µL Final    Eosinophils Absolute 0 02  0 00 - 0 61 Thousand/µL Final    Basophils Absolute 0 06  0 00 - 0 10 Thousands/µL Final   COMPREHENSIVE METABOLIC PANEL - Abnormal     Sodium 131 (*) 136 - 145 mmol/L Final    Potassium 4 1  3 5 - 5 3 mmol/L Final    Chloride 97 (*) 100 - 108 mmol/L Final    CO2 23  21 - 32 mmol/L Final    Anion Gap 11  4 - 13 mmol/L Final    BUN 16  5 - 25 mg/dL Final    Creatinine 1 08  0 60 - 1 30 mg/dL Final    Comment: Standardized to IDMS reference method    Glucose 102  65 - 140 mg/dL Final    Comment:   If the patient is fasting, the ADA then defines impaired fasting glucose as > 100 mg/dL and diabetes as > or equal to 123 mg/dL  Specimen collection should occur prior to Sulfasalazine administration due to the potential for falsely depressed results  Specimen collection should occur prior to Sulfapyridine administration due to the potential for falsely elevated results      Calcium 9 2  8 3 - 10 1 mg/dL Final    AST 34  5 - 45 U/L Final    Comment:   Specimen collection should occur prior to Sulfasalazine administration due to the potential for falsely depressed results  ALT 43  12 - 78 U/L Final    Comment:   Specimen collection should occur prior to Sulfasalazine administration due to the potential for falsely depressed results  Alkaline Phosphatase 253 (*) 46 - 116 U/L Final    Total Protein 7 7  6 4 - 8 2 g/dL Final    Albumin 2 6 (*) 3 5 - 5 0 g/dL Final    Total Bilirubin 0 65  0 20 - 1 00 mg/dL Final    eGFR 50  ml/min/1 73sq m Final    Narrative:     National Kidney Disease Education Program recommendations are as follows:  GFR calculation is accurate only with a steady state creatinine  Chronic Kidney disease less than 60 ml/min/1 73 sq  meters  Kidney failure less than 15 ml/min/1 73 sq  meters  LACTIC ACID, PLASMA - Normal    LACTIC ACID 1 2  0 5 - 2 0 mmol/L Final    Narrative:     Result may be elevated if tourniquet was used during collection  TROPONIN I - Normal    Troponin I <0 02  <=0 04 ng/mL Final    Comment: 3Autovalidation override  [employer] Chemistry analyzer 99% cutoff is > 0 04 ng/mL in network labs     o cTnI 99% cutoff is useful only when applied to patients in the clinical setting of myocardial ischemia   o cTnI 99% cutoff should be interpreted in the context of clinical history, ECG findings and possibly cardiac imaging to establish correct diagnosis  o cTnI 99% cutoff may be suggestive but clearly not indicative of a coronary event without the clinical setting of myocardial ischemia       BLOOD CULTURE   BLOOD CULTURE   LACTIC ACID, PLASMA   UA W REFLEX TO MICROSCOPIC WITH REFLEX TO CULTURE     Time reflects when diagnosis was documented in both MDM as applicable and the Disposition within this note     Time User Action Codes Description Comment    7/29/2018  1:46 PM Pegge Shires Add [J18 1] LLL pneumonia ([facility] [address] )     7/29/2018  1:46 PM Pegge Shires Add [R00 0] Tachycardia     7/29/2018  1:46 PM Merrilyn Nest [E86 0] Dehydration     7/29/2018 1:46 PM Karan Malave Add [R06 00] Dyspnea       ED Disposition     ED Disposition Condition Comment    Admit  Case was discussed with kenzie and the patient's admission status was agreed to be Admission Status: inpatient status to the service of Dr Diane Law   Follow-up Information    None       Patient's Medications   Discharge Prescriptions    No medications on file     No discharge procedures on file  Prior to Admission Medications   Prescriptions Last Dose Informant Patient Reported? Taking? Mirabegron ER (MYRBETRIQ) 25 MG TB24   Yes Yes   Sig: Take by mouth daily  Nepafenac (ILEVRO) 0 3 % SUSP   Yes Yes   Sig: Apply to eye   Pyridoxine HCl (VITAMIN B-6) 25 MG tablet   Yes No   Sig: Take 1 tablet by mouth daily   Sodium Phosphates ([employer] ENEMA DISPOSABLE) 19-7 GM/118ML ENEM   Yes Yes   Sig: Insert into the rectum   acetaminophen (TYLENOL) 325 mg tablet   Yes Yes   Sig: Take 650 mg by mouth every 6 (six) hours as needed for mild pain  acetaminophen (TYLENOL) 650 mg suppository   Yes Yes   Sig: Insert 1 suppository into the rectum as needed   albuterol (2 5 mg/3 mL) 0 083 % nebulizer solution   Yes Yes   Sig: Take 2 5 mg by nebulization every 6 (six) hours as needed for wheezing or shortness of breath   benzocaine-menthol (CEPACOL SORE THROAT) 15-3 6 mg per lozenge   Yes Yes   Sig: Apply 1 lozenge to the mouth or throat every 4 (four) hours   bisacodyl (FLEET) 10 MG/30ML ENEM   Yes Yes   Sig: Insert 10 mg into the rectum once   budesonide-formoterol (SYMBICORT) 160-4 5 mcg/act inhaler  Outside Facility (Specify) Yes Yes   Sig: Inhale 2 puffs 2 (two) times a day Rinse mouth after use     ferrous sulfate 325 (65 Fe) mg tablet   Yes Yes   Sig: Take 325 mg by mouth daily at bedtime   folic acid (FOLVITE) 1 mg tablet   Yes Yes   Sig: Take 1 mg by mouth daily   levETIRAcetam (KEPPRA) 750 mg tablet   No Yes   Sig: Take 1 tablet (750 mg total) by mouth every 12 (twelve) hours   lisinopril (ZESTRIL) 5 mg tablet   Yes Yes   Sig: Take 5 mg by mouth daily  magnesium hydroxide (MILK OF MAGNESIA) 800 MG/5ML suspension   Yes No   Sig: Take 30 mL by mouth daily as needed for constipation   neomycin-bacitracin-polymyxin (NEOSPORIN) 5-400-5,000 ointment  Outside Facility (Specify) Yes No   Sig: Apply topically 4 (four) times a day   pravastatin (PRAVACHOL) 10 mg tablet   Yes Yes   Sig: Take 10 mg by mouth daily  pyridostigmine (MESTINON) 60 mg tablet   No Yes   Sig: Take 0 5 tablets (30 mg total) by mouth 3 (three) times a day   sodium phosphate (FLEET) enema   Yes Yes   Sig: Insert 1 enema into the rectum once follow package directions   thiamine (VITAMIN B1) 100 mg tablet   Yes Yes   Sig: Take 1 tablet by mouth daily   tiotropium (SPIRIVA) 18 mcg inhalation capsule   Yes Yes   Sig: Place 18 mcg into inhaler and inhale daily  warfarin (COUMADIN) 2 mg tablet   Yes Yes   Sig: Take 2 mg by mouth daily        Facility-Administered Medications: None       Portions of the record may have been created with voice recognition software  Occasional wrong word or "sound a like" substitutions may have occurred due to the inherent limitations of voice recognition software  Read the chart carefully and recognize, using context, where substitutions have occurred      Electronically signed by:  Blaze Crespo MD  07/29/18 0437

## 2018-07-29 NOTE — ASSESSMENT & PLAN NOTE
Without exacerbation  Continue Symbicort 160 mg 2 puffs b i d    Resume Spiriva 18 mcg daily  Add Xopenex updraft nebulizations as needed for wheezing and shortness of breath

## 2018-07-29 NOTE — ASSESSMENT & PLAN NOTE
Left lower lobe, not improved with Levaquin  Due to current residence in a nursing home, continue cefepime for gram-negative pneumonia, continue vancomycin for MRSA coverage    Check urine Legionella and streptococcal antigen  Check MRSA screen  Follow up blood culture results

## 2018-07-29 NOTE — ASSESSMENT & PLAN NOTE
She presented with fever/leukocytosis/tachycardia/and tachypnea meeting sepsis criteria  She has a left lower lobe infiltrate confirmed on chest x-ray  She has no hypotension/renal failure/lactic acidosis/or change in mental status  Follow up blood culture results  She is a DNR DNI    POLST reviewed

## 2018-07-29 NOTE — ASSESSMENT & PLAN NOTE
Currently stable without exacerbation  Continue pyridostigmine 60 mg, 0 5 mg 3 times a day  Check NIF and VC Q shift

## 2018-07-29 NOTE — H&P
H&P- Jose A Robert 1945, 74 y o  female MRN: [id number]    Unit/Bed#: E4 -01 Encounter: [id number]    Primary Care Provider: Tavia Hanson MD   Date and time admitted to hospital: 7/29/2018 12:22 PM        * Sepsis due to pneumonia [facility])   Assessment & Plan    She presented with fever/leukocytosis/tachycardia/and tachypnea meeting sepsis criteria  She has a left lower lobe infiltrate confirmed on chest x-ray  She has no hypotension/renal failure/lactic acidosis/or change in mental status  Follow up blood culture results  She is a DNR DNI  POLST reviewed            HCAP (healthcare-associated pneumonia)   Assessment & Plan    Left lower lobe, not improved with Levaquin  Due to current residence in a nursing home, continue cefepime for gram-negative pneumonia, continue vancomycin for MRSA coverage  Check urine Legionella and streptococcal antigen  Check MRSA screen  Follow up blood culture results          Chronic respiratory failure with hypoxia (HCC)   Assessment & Plan    Wean oxygen to baseline 3 L by nasal cannula  Titrate oxygen saturation to more than 90%        COPD (chronic obstructive pulmonary disease) (HCC)   Assessment & Plan    Without exacerbation  Continue Symbicort 160 mg 2 puffs b i d  Resume Spiriva 18 mcg daily  Add Xopenex updraft nebulizations as needed for wheezing and shortness of breath        Chronic anticoagulation   Assessment & Plan    INR supratherapeutic  Hold warfarin  Monitor PT INR with new antibiotics        Myasthenia gravis, AChR antibody positive ([facility])   Assessment & Plan    Currently stable without exacerbation  Continue Spiriva stick mean 60 mg, 0 5 mg 3 times a day        Epilepsy ([facility])   Assessment & Plan    Continue Keppra 750 mg b i d     Watch for seizure given recent exposure to Levaquin          VTE Prophylaxis: Warfarin (Coumadin)  / reason for no mechanical VTE prophylaxis hypersensitive legs   Code Status: dnr  POLST: POLST form is on file already (pre-hospital)  Discussion with family: Spoke with daughter Sander Irwin    Anticipated Length of Stay:  Patient will be admitted on an Inpatient basis with an anticipated length of stay of  At least 2 midnights  Justification for Hospital Stay: sepsis    Total Time for Visit, including Counseling / Coordination of Care: 45 minutes  Greater than 50% of this total time spent on direct patient counseling and coordination of care  Chief Complaint:   fever    History of Present Illness:    Jennifer House is a 74 y o  female who presents with fever  She is a 57-year-old female, residing at [facility]  Two days ago she was found to have fever and was diagnosed with left lower lobe pneumonia and was started on Levaquin  The patient is a rather poor historian with known poor short-term memory  She recalled having poor appetite for the past week  She may have had cough with greenish sputum starting 3 days ago  She denied difficulty urinating/burning on urination/diarrhea  At baseline she is wheelchair bound but is able to propel herself independently  She eats regular food with thin liquids  She has oxygen via nasal cannula at 3 L according to [facility] records  Due to persistent fever and shortness of breath she was sent to the hospital   Prior to admission she received 1 DuoNeb  In the ER she was noted to be tachycardic, tachypneic, and with a low-grade temperature  Repeat chest x-ray confirmed left lower lobe infiltrate  She met sepsis criteria and was referred to our service for admission  Due to her poor short-term memory, history was mostly obtained from ER record and nursing home records  Review of Systems:    Review of Systems   Constitutional: Positive for appetite change and fever  Negative for unexpected weight change  HENT: Negative  Eyes: Negative  Respiratory: Positive for shortness of breath  Cardiovascular: Negative for chest pain and leg swelling     Gastrointestinal: Negative  Genitourinary: Negative  Musculoskeletal: Negative  Neurological:        She is not sure if she fell or not   Psychiatric/Behavioral: Negative  All other systems reviewed and are negative  Past Medical and Surgical History:     Past Medical History:   Diagnosis Date    Atrial septal defect     COPD (chronic obstructive pulmonary disease) ([facility] )     CVA (cerebral vascular accident) ([facility] )     Epilepsy ([facility] )     History of DVT (deep vein thrombosis)     Hyperlipidemia     Hyperlipidemia     Hypertension        Past Surgical History:   Procedure Laterality Date    HYSTERECTOMY      VARICOSE VEIN SURGERY         Meds/Allergies:    Prior to Admission medications    Medication Sig Start Date End Date Taking? Authorizing Provider   acetaminophen (TYLENOL) 325 mg tablet Take 650 mg by mouth every 6 (six) hours as needed for mild pain  Yes Historical Provider, MD   acetaminophen (TYLENOL) 650 mg suppository Insert 1 suppository into the rectum as needed   Yes Historical Provider, MD   albuterol (2 5 mg/3 mL) 0 083 % nebulizer solution Take 2 5 mg by nebulization every 6 (six) hours as needed for wheezing or shortness of breath   Yes Historical Provider, MD   benzocaine-menthol (CEPACOL SORE THROAT) 15-3 6 mg per lozenge Apply 1 lozenge to the mouth or throat every 4 (four) hours   Yes Historical Provider, MD   bisacodyl (FLEET) 10 MG/30ML ENEM Insert 10 mg into the rectum once   Yes Historical Provider, MD   budesonide-formoterol (SYMBICORT) 160-4 5 mcg/act inhaler Inhale 2 puffs 2 (two) times a day Rinse mouth after use     Yes Historical Provider, MD   ferrous sulfate 325 (65 Fe) mg tablet Take 325 mg by mouth daily at bedtime   Yes Historical Provider, MD   folic acid (FOLVITE) 1 mg tablet Take 1 mg by mouth daily   Yes Historical Provider, MD   levETIRAcetam (KEPPRA) 750 mg tablet Take 1 tablet (750 mg total) by mouth every 12 (twelve) hours 7/9/18  Yes Faye Herbert MD   lisinopril (ZESTRIL) 5 mg tablet Take 5 mg by mouth daily  Yes Historical Provider, MD   Mirabegron ER (MYRBETRIQ) 25 MG TB24 Take by mouth daily  Yes Historical Provider, MD   Nepafenac (ILEVRO) 0 3 % SUSP Apply to eye 2/27/17  Yes Historical Provider, MD   pravastatin (PRAVACHOL) 10 mg tablet Take 10 mg by mouth daily  Yes Historical Provider, MD   pyridostigmine (MESTINON) 60 mg tablet Take 0 5 tablets (30 mg total) by mouth 3 (three) times a day 7/9/18  Yes Afia Loredo MD   sodium phosphate (FLEET) enema Insert 1 enema into the rectum once follow package directions   Yes Historical Provider, MD   Sodium Phosphates ([employer] ENEMA DISPOSABLE) 19-7 GM/118ML ENEM Insert into the rectum   Yes Historical Provider, MD   thiamine (VITAMIN B1) 100 mg tablet Take 1 tablet by mouth daily 2/27/17  Yes Historical Provider, MD   tiotropium (SPIRIVA) 18 mcg inhalation capsule Place 18 mcg into inhaler and inhale daily  Yes Historical Provider, MD   warfarin (COUMADIN) 2 mg tablet Take 2 mg by mouth daily     Yes Historical Provider, MD   magnesium hydroxide (MILK OF MAGNESIA) 800 MG/5ML suspension Take 30 mL by mouth daily as needed for constipation    Historical Provider, MD   neomycin-bacitracin-polymyxin (NEOSPORIN) 5-400-5,000 ointment Apply topically 4 (four) times a day    Historical Provider, MD   Pyridoxine HCl (VITAMIN B-6) 25 MG tablet Take 1 tablet by mouth daily 2/27/17   Historical Provider, MD   Difluprednate (DUREZOL) 0 05 % EMUL Administer 1 drop to the right eye 3 (three) times a day  7/29/18  Historical Provider, MD   docusate sodium (COLACE) 100 mg capsule Take 100 mg by mouth daily    7/29/18  Historical Provider, MD   fluticasone-salmeterol (ADVAIR) 250-50 mcg/dose inhaler Inhale 1 puff every 12 (twelve) hours  7/29/18  Historical Provider, MD   warfarin (COUMADIN) 3 mg tablet Take 3 mg by mouth daily Patient takes 1 tablet orally at bedtime every Mon, Wed, Friday 7/29/18  Historical Provider, MD     I have reveiwed home medications using records provided by [facility]  Allergies: No Known Allergies    Social History:     Marital Status:    Occupation:  None  Patient Pre-hospital Living Situation:  Lives at [facility]  Patient Pre-hospital Level of Mobility:  Wheelchair-bound  Patient Pre-hospital Diet Restrictions:  Regular with thin liquids  Substance Use History:   History   Alcohol Use No     History   Smoking Status    Former Smoker    Packs/day: 0 25    Years: 40 00   Smokeless Tobacco    Never Used     History   Drug Use No       Family History:    Her father  in his [de-identified]  Her mother had severe varicosities  Per old records hypertension hyperlipidemia and stroke runs in the family    Physical Exam:     Vitals:   Blood Pressure: 117/78 (18 1500)  Pulse: 105 (18 1500)  Temperature: 98 1 °F (36 7 °C) (18 1500)  Temp Source: Temporal (18 1500)  Respirations: 18 (18 1500)  Height: 5' 5" (165 1 cm) (18 1500)  Weight - Scale: 56 5 kg (124 lb 8 oz) (18 1227)  SpO2: 92 % (18 1500)    Physical Exam   Constitutional: She appears well-developed and well-nourished  HENT:   Head: Normocephalic and atraumatic  Eyes:   Diminished periorbital fat   Neck: No JVD present  Cardiovascular: Regular rhythm  Exam reveals no gallop and no friction rub  No murmur heard  Pulmonary/Chest: She has no wheezes  Decreased breath sounds on both bases  Diminished breath sounds on upper lung field   Abdominal: Soft  She exhibits no distension  There is no tenderness  Musculoskeletal:   Prominent varicose vein  Hypersensitive to touch on both legs   Neurological: She is alert  Skin: Skin is warm and dry  Psychiatric: She has a normal mood and affect  Additional Data:     Lab Results: I have personally reviewed pertinent reports          Results from last 7 days  Lab Units 18  1237   WBC Thousand/uL 19 75*   HEMOGLOBIN g/dL 9 8*   HEMATOCRIT % 30 0* PLATELETS Thousands/uL 574*   NEUTROS PCT % 82*   LYMPHS PCT % 9*   MONOS PCT % 9   EOS PCT % 0       Results from last 7 days  Lab Units 07/29/18  1237   SODIUM mmol/L 131*   POTASSIUM mmol/L 4 1   CHLORIDE mmol/L 97*   CO2 mmol/L 23   BUN mg/dL 16   CREATININE mg/dL 1 08   CALCIUM mg/dL 9 2   TOTAL PROTEIN g/dL 7 7   BILIRUBIN TOTAL mg/dL 0 65   ALK PHOS U/L 253*   ALT U/L 43   AST U/L 34   GLUCOSE RANDOM mg/dL 102       Results from last 7 days  Lab Units 07/29/18  1237   INR  4 02*               Imaging: I have personally reviewed pertinent reports  and I have personally reviewed pertinent films in PACS    X-ray chest 2 views   ED Interpretation by Jane Sun MD (07/29 7674)   Abnormal   The CXR was ordered by me and interpreted by me independently  On my read, it appears:   - LLL PNA definitely   - RML patchiness   maybe multifocal then?    - she has enough risk factors for aspiration      Final Result by Anthony Skelton MD (07/29 0955)      Right apical pleural parenchymal scarring  Predominantly left lower lung consolidation most concerning for pneumonia  Follow-up to ensure resolution recommended  Workstation performed: [id number]             EKG, Pathology, and Other Studies Reviewed on Admission:   · EKG:  Sinus tachycardia    Allscripts / Epic Records Reviewed: Yes     ** Please Note: This note has been constructed using a voice recognition system   **

## 2018-07-30 LAB
ANION GAP SERPL CALCULATED.3IONS-SCNC: 8 MMOL/L (ref 4–13)
BASOPHILS # BLD MANUAL: 0 THOUSAND/UL (ref 0–0.1)
BASOPHILS NFR MAR MANUAL: 0 % (ref 0–1)
BUN SERPL-MCNC: 16 MG/DL (ref 5–25)
CALCIUM SERPL-MCNC: 8.8 MG/DL (ref 8.3–10.1)
CHLORIDE SERPL-SCNC: 106 MMOL/L (ref 100–108)
CO2 SERPL-SCNC: 21 MMOL/L (ref 21–32)
CREAT SERPL-MCNC: 0.91 MG/DL (ref 0.6–1.3)
EOSINOPHIL # BLD MANUAL: 0 THOUSAND/UL (ref 0–0.4)
EOSINOPHIL NFR BLD MANUAL: 0 % (ref 0–6)
ERYTHROCYTE [DISTWIDTH] IN BLOOD BY AUTOMATED COUNT: 13.9 % (ref 11.6–15.1)
GFR SERPL CREATININE-BSD FRML MDRD: 62 ML/MIN/1.73SQ M
GLUCOSE SERPL-MCNC: 140 MG/DL (ref 65–140)
HCT VFR BLD AUTO: 25 % (ref 34.8–46.1)
HGB BLD-MCNC: 8 G/DL (ref 11.5–15.4)
INR PPP: 5.44 (ref 0.86–1.17)
LYMPHOCYTES # BLD AUTO: 0.76 THOUSAND/UL (ref 0.6–4.47)
LYMPHOCYTES # BLD AUTO: 5 % (ref 14–44)
MCH RBC QN AUTO: 29.9 PG (ref 26.8–34.3)
MCHC RBC AUTO-ENTMCNC: 32 G/DL (ref 31.4–37.4)
MCV RBC AUTO: 93 FL (ref 82–98)
MONOCYTES # BLD AUTO: 0.46 THOUSAND/UL (ref 0–1.22)
MONOCYTES NFR BLD: 3 % (ref 4–12)
NEUTROPHILS # BLD MANUAL: 14 THOUSAND/UL (ref 1.85–7.62)
NEUTS BAND NFR BLD MANUAL: 1 % (ref 0–8)
NEUTS SEG NFR BLD AUTO: 91 % (ref 43–75)
NRBC BLD AUTO-RTO: 0 /100 WBCS
PLATELET # BLD AUTO: 430 THOUSANDS/UL (ref 149–390)
PLATELET BLD QL SMEAR: ABNORMAL
PMV BLD AUTO: 9.5 FL (ref 8.9–12.7)
POTASSIUM SERPL-SCNC: 4 MMOL/L (ref 3.5–5.3)
PROTHROMBIN TIME: 49.4 SECONDS (ref 11.8–14.2)
RBC # BLD AUTO: 2.68 MILLION/UL (ref 3.81–5.12)
RBC MORPH BLD: NORMAL
SODIUM SERPL-SCNC: 135 MMOL/L (ref 136–145)
TOTAL CELLS COUNTED SPEC: 100
WBC # BLD AUTO: 15.22 THOUSAND/UL (ref 4.31–10.16)

## 2018-07-30 PROCEDURE — G8987 SELF CARE CURRENT STATUS: HCPCS

## 2018-07-30 PROCEDURE — 97163 PT EVAL HIGH COMPLEX 45 MIN: CPT

## 2018-07-30 PROCEDURE — 85610 PROTHROMBIN TIME: CPT | Performed by: HOSPITALIST

## 2018-07-30 PROCEDURE — G8979 MOBILITY GOAL STATUS: HCPCS

## 2018-07-30 PROCEDURE — 97166 OT EVAL MOD COMPLEX 45 MIN: CPT

## 2018-07-30 PROCEDURE — 85007 BL SMEAR W/DIFF WBC COUNT: CPT | Performed by: INTERNAL MEDICINE

## 2018-07-30 PROCEDURE — G8978 MOBILITY CURRENT STATUS: HCPCS

## 2018-07-30 PROCEDURE — 99232 SBSQ HOSP IP/OBS MODERATE 35: CPT | Performed by: HOSPITALIST

## 2018-07-30 PROCEDURE — 80048 BASIC METABOLIC PNL TOTAL CA: CPT | Performed by: INTERNAL MEDICINE

## 2018-07-30 PROCEDURE — G8988 SELF CARE GOAL STATUS: HCPCS

## 2018-07-30 PROCEDURE — 85027 COMPLETE CBC AUTOMATED: CPT | Performed by: INTERNAL MEDICINE

## 2018-07-30 RX ADMIN — BUDESONIDE AND FORMOTEROL FUMARATE DIHYDRATE 2 PUFF: 160; 4.5 AEROSOL RESPIRATORY (INHALATION) at 17:02

## 2018-07-30 RX ADMIN — PYRIDOSTIGMINE BROMIDE 30 MG: 60 TABLET ORAL at 17:01

## 2018-07-30 RX ADMIN — Medication 100 MG: at 08:32

## 2018-07-30 RX ADMIN — SODIUM CHLORIDE 125 ML/HR: 0.9 INJECTION, SOLUTION INTRAVENOUS at 21:37

## 2018-07-30 RX ADMIN — LEVETIRACETAM 750 MG: 500 TABLET ORAL at 17:00

## 2018-07-30 RX ADMIN — CEFEPIME HYDROCHLORIDE 1000 MG: 1 INJECTION, SOLUTION INTRAVENOUS at 13:24

## 2018-07-30 RX ADMIN — FOLIC ACID 1 MG: 1 TABLET ORAL at 10:50

## 2018-07-30 RX ADMIN — PYRIDOSTIGMINE BROMIDE 30 MG: 60 TABLET ORAL at 08:33

## 2018-07-30 RX ADMIN — BENZONATATE 100 MG: 100 CAPSULE ORAL at 17:00

## 2018-07-30 RX ADMIN — CEFEPIME HYDROCHLORIDE 1000 MG: 1 INJECTION, SOLUTION INTRAVENOUS at 02:17

## 2018-07-30 RX ADMIN — SODIUM CHLORIDE 125 ML/HR: 0.9 INJECTION, SOLUTION INTRAVENOUS at 08:34

## 2018-07-30 RX ADMIN — BUDESONIDE AND FORMOTEROL FUMARATE DIHYDRATE 2 PUFF: 160; 4.5 AEROSOL RESPIRATORY (INHALATION) at 08:32

## 2018-07-30 RX ADMIN — PYRIDOSTIGMINE BROMIDE 30 MG: 60 TABLET ORAL at 21:37

## 2018-07-30 RX ADMIN — TIOTROPIUM BROMIDE 18 MCG: 18 CAPSULE ORAL; RESPIRATORY (INHALATION) at 08:32

## 2018-07-30 RX ADMIN — OXYBUTYNIN CHLORIDE 5 MG: 10 TABLET, EXTENDED RELEASE ORAL at 08:32

## 2018-07-30 RX ADMIN — LEVETIRACETAM 750 MG: 500 TABLET ORAL at 04:33

## 2018-07-30 RX ADMIN — LISINOPRIL 5 MG: 5 TABLET ORAL at 08:32

## 2018-07-30 RX ADMIN — PRAVASTATIN SODIUM 10 MG: 10 TABLET ORAL at 17:00

## 2018-07-30 RX ADMIN — Medication 25 MG: at 08:33

## 2018-07-30 RX ADMIN — VANCOMYCIN HYDROCHLORIDE 750 MG: 750 INJECTION, SOLUTION INTRAVENOUS at 14:26

## 2018-07-30 NOTE — CASE MANAGEMENT
Initial Clinical Review    Admission: Date/Time/Statement: 7/29/18 @ 1347     Orders Placed This Encounter   Procedures    Inpatient Admission (expected length of stay for this patient is greater than two midnights)     Standing Status:   Standing     Number of Occurrences:   1     Order Specific Question:   Admitting Physician     Answer:   Jo Shanda [[id number]]     Order Specific Question:   Level of Care     Answer:   Med Surg [16]     Order Specific Question:   Estimated length of stay     Answer:   More than 2 Midnights     Order Specific Question:   Certification     Answer:   I certify that inpatient services are medically necessary for this patient for a duration of greater than two midnights  See H&P and MD Progress Notes for additional information about the patient's course of treatment  ED: Date/Time/Mode of Arrival:   ED Arrival Information     Expected Arrival Acuity Means of Arrival Escorted By Service Admission Type    - 7/29/2018 12:22 Emergent Ambulance [address] Emergency    Arrival Complaint    sob          Chief Complaint:   Chief Complaint   Patient presents with    Shortness of Breath     Pt brought by EMS from [facility] for fever and respiratory difficulty  Pt has hx of COPD  Pt given duo neb and 125 mg solumedrol by EMS  Pt diagnosed with, "Left lower lobe infiltrate and was given her first dose of levaquin yesterday " Pt arrives tachypnic  Pt has COPD and where's 3L/min NC  History of Illness: 74year old female presenting for evaluation of dyspnea, likely PNA  The patient can't give me reliable history though I don't see dementia on her previous diagnoses  Per EMS/patient/nursing home, she was well until recently when she had fever (Tmax 101f today prior to coming in) as well as dyspnea/ARF  She was started on abx yesterday (Levaquin x1 dose)     Today because of worsening symptoms, EMS was called who brought her in for evaluation  No fever noted here  I went to evaluate the patient immediately on arrival given dyspnea and ill appearance  Labored   No retractions  Decreased bilateral air movement  No crepitus  +tachypnea    ED Vital Signs:   ED Triage Vitals [07/29/18 1227]   Temperature Pulse Respirations Blood Pressure SpO2   99 2 °F (37 3 °C) (!) 123 (!) 38 109/61 95 %      Temp Source Heart Rate Source Patient Position - Orthostatic VS BP Location FiO2 (%)   Oral Monitor Sitting Left arm --      Pain Score       No Pain        Wt Readings from Last 1 Encounters:   07/29/18 56 5 kg (124 lb 8 oz)       Vital Signs (abnormal):    --  112  26 113/60 97 % -- AMK   07/29/18 1310 --  109  30 109/58 93 % -- JRK   07/29/18 1230  101 °F (38 3 °C) -- -- -- -- -- AMK   07/29/18 1227 99 2 °F (37 3 °C)  123  38 109/61 95 % 56 5 kg (124 lb 8 oz)        Abnormal Labs/Diagnostic Test Results:  WBC's 19 75,   H&H 9 8 / 30,   Plats 574,   ANC 16 25  Na 131,   Cl 97,   Alk phos 253,   Alb 2 6  INR 4 02  BC's pending  Urine:   + nitrite,  Small blood  CXR: Right apical pleural parenchymal scarring  Predominantly left lower lung consolidation most concerning for pneumonia   Follow-up to ensure resolution recommended      ED Treatment:   Medication Administration from 07/29/2018 1222 to 07/29/2018 1451       Date/Time Order Dose Route Action Action by Comments     07/29/2018 1227  EMS REPLENISHMENT MED 0  Does not apply Given to EMS Payal Pal RN      07/29/2018 1314 sodium chloride 0 9 % bolus 1,500 mL 1,500 mL Intravenous [address]  [facility], [address] [facility]      07/29/2018 1313 albuterol inhalation solution 5 mg 5 mg Nebulization Given Payal Pal RN      07/29/2018 1313 ipratropium (ATROVENT) 0 02 % inhalation solution 0 5 mg 0 5 mg Nebulization Given Payal Pal RN      07/29/2018 1403 vancomycin (VANCOCIN) IVPB (premix) 750 mg 750 mg Intravenous [address], [address] [place] [address]      07/29/2018 1320 vancomycin (VANCOCIN) IVPB (premix) 750 mg 750 mg Intravenous Not Given Elijah Gaines RN Medication has not been delivered by pharmacy at this time  07/29/2018 1359 cefepime (MAXIPIME) IVPB (premix) 1,000 mg 0 mg Intravenous Stopped Caroline Renteria RN      07/29/2018 1320 cefepime (MAXIPIME) IVPB (premix) 1,000 mg 1,000 mg Intravenous New Bag Elijah Gaines RN           Past Medical/Surgical History:    Active Ambulatory Problems     Diagnosis Date Noted    Hypertension     Hypercholesterolemia     Epilepsy ([place] [address] )     [facility]     Atrial septal defect     History of DVT (deep vein thrombosis)     CVA (cerebral vascular accident) ([place] [address] )     Cellulitis and abscess 01/29/2016    COPD (chronic obstructive pulmonary disease) ([place] [address] ) 02/02/2016    Chronic respiratory failure with hypoxia ([place] [address] ) 02/02/2016    Protein-calorie malnutrition, moderate ([facility]) 02/02/2016    Myasthenia gravis, AChR antibody positive ([place] [address] ) 03/15/2018    Cataract 06/22/2012    Depression 06/20/2012    Venous insufficiency 06/20/2012    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    Ptosis of eyelid 09/15/2014    Patent foramen ovale 08/13/2012    Onychomycosis of toenail 01/21/2014    Mild cognitive impairment with memory loss 01/17/2017    Headache 03/11/2013    GERD (gastroesophageal reflux disease) 08/07/2015     Resolved Ambulatory Problems     Diagnosis Date Noted    No Resolved Ambulatory Problems     Past Medical History:   Diagnosis Date    Atrial septal defect     [facility]ch[facility]) [facility]     CVA (cerebral vascular accident) ([place] [address] )     Epilepsy ([place] [address] )     History of DVT (deep vein thrombosis)     Hyperlipidemia     Hyperlipidemia     Hypertension        Admitting Diagnosis: Dehydration [E86 0]  Dyspnea [R06 00]  SOB (shortness of breath) [R06 02]  Tachycardia [R00 0]  LLL pneumonia ([facility]) [J18 1]    Age/Sex: 74 y o  female    Assessment/Plan: Sepsis due to pneumonia [facility])   Assessment & Plan     She presented with fever/leukocytosis/tachycardia/and tachypnea meeting sepsis criteria  She has a left lower lobe infiltrate confirmed on chest x-ray  She has no hypotension/renal failure/lactic acidosis/or change in mental status  Follow up blood culture results  She is a DNR DNI  POLST reviewed                HCAP (healthcare-associated pneumonia)   Assessment & Plan     Left lower lobe, not improved with Levaquin  Due to current residence in a nursing home, continue cefepime for gram-negative pneumonia, continue vancomycin for MRSA coverage  Check urine Legionella and streptococcal antigen  Check MRSA screen  Follow up blood culture results             Chronic respiratory failure with hypoxia ([facility])   Assessment & Plan     Wean oxygen to baseline 3 L by nasal cannula  Titrate oxygen saturation to more than 90%          COPD (chronic obstructive pulmonary disease) ([facility])   Assessment & Plan     Without exacerbation  Continue Symbicort 160 mg 2 puffs b i d  Resume Spiriva 18 mcg daily  Add Xopenex updraft nebulizations as needed for wheezing and shortness of breath          Chronic anticoagulation   Assessment & Plan     INR supratherapeutic  Hold warfarin  Monitor PT INR with new antibiotics          Myasthenia gravis, AChR antibody positive ([facility])   Assessment & Plan     Currently stable without exacerbation  Continue Spiriva stick mean 60 mg, 0 5 mg 3 times a day          Epilepsy ([facility])   Assessment & Plan     Continue Keppra 750 mg b i d     Watch for seizure given recent exposure to Levaquin             VTE Prophylaxis: Warfarin (Coumadin)  / reason for no mechanical VTE prophylaxis hypersensitive legs   Code Status: dnr  POLST: POLST form is on file already (pre-hospital)  Discussion with family: Spoke with daughter Renee Lama  Anticipated Length of Stay:  Patient will be admitted on an Inpatient basis with an anticipated length of stay of  At least 2 midnights  Justification for Hospital Stay: sepsis     Admission Orders:  IP  Aspiration precautions  O2 @ 3 liters  PT / OT Eval  Sputum Cx,   Urine Legionella and strop pneumoniae      Scheduled Meds:   Current Facility-Administered Medications:                  budesonide-formoterol 2 puff Inhalation BID Crys Magallon MD    cefepime 1,000 mg Intravenous Q12H Crys Magallon MD Last Rate: 1,000 mg (83/66/65 4282)   folic acid 1 mg Oral Daily Crys Magallon MD                    levETIRAcetam 750 mg Oral Q12H Crys Magallon MD    lisinopril 5 mg Oral Daily Crys Magallon MD    oxybutynin 5 mg Oral Daily Crys Magallon MD            pravastatin 10 mg Oral Daily With Carol Greene MD    pyridostigmine 30 mg Oral TID Crys Magallon MD    vitamin B-6 25 mg Oral Daily Crys Magallon MD            thiamine 100 mg Oral Daily Crys Magallon MD    tiotropium 18 mcg Inhalation Daily Crys Magallon MD    vancomycin 750 mg Intravenous Q24H Crys Magallon MD      Continuous Infusions:   sodium chloride 125 mL/hr Last Rate: 125 mL/hr (07/30/18 0834)     PRN Meds:   acetaminophen    benzonatate    guaiFENesin    levalbuterol    polyethylene glycol      Thank you,  Yun Aqq  [facility] Utilization Review Department  Phone: [phone]; Fax [phone]  ATTENTION: The Network Utilization Review Department is now centralized for our 9 Facilities  Make a note that we have a new phone and fax numbers for our Department  Please call with any questions or concerns to [phone] and carefully follow the prompts so that you are directed to the right person  All voicemails are confidential  Fax any determinations, approvals, denials, and requests for initial or continue stay review clinical to [phone]  Due to HIGH CALL volume, it would be easier if you could please send faxed requests to expedite your requests and in part, help us provide discharge notifications faster

## 2018-07-30 NOTE — PHYSICAL THERAPY NOTE
PT EVALUATION    74 y o     [id number]    Dehydration [E86 0]  Dyspnea [R06 00]  SOB (shortness of breath) [R06 02]  Tachycardia [R00 0]  LLL pneumonia (HCC) [J18 1]    Past Medical History:   Diagnosis Date    Atrial septal defect     COPD (chronic obstructive pulmonary disease) (HCC)     [facility]cere[facility] [address] [facility]     [facility]     History of DVT (deep vein thrombosis)     Hyperlipidemia     Hyperlipidemia     Hypertension          Past Surgical History:   Procedure Laterality Date    HYSTERECTOMY      VARICOSE VEIN SURGERY        07/30/18 0825   Note Type   Note type Eval only   Pain Assessment   Pain Assessment No/denies pain   Home Living   Type of Home SNF  ([facility])   Home Layout One level   [address]; Wheelchair-manual   Additional Comments Resides at [facility] x 5 years   Prior Function   Level of [place] Independent with ADLs and functional mobility  (per pt)   Lives With Facility staff   Receives Help From Personal care attendant   ADL Assistance Independent  (S for showering)   IADLs Needs assistance   Falls in the last 6 months 0  (denies)   Vocational Retired   Comments Reports I with OOB to [facility] and ambulation short distances without AD in her room  Self propels WC at [facility]  Chronic O2 use at 2-3L  Restrictions/Precautions   Weight Bearing Precautions Per Order No   Other Precautions Multiple lines;O2;Fall Risk; Chair Alarm; Bed Alarm   General   Additional Pertinent History Pt is 73 y/o female admitted wt sepsis 2* pnuemonia,  PT consulted  Up with assist    Family/Caregiver Present No   Cognition   Arousal/Participation Alert   Orientation Level Oriented to person   Comments Pleasant  Tearful at times-worried about getting back to [facility]     RUE Assessment   RUE Assessment WFL   LUE Assessment   LUE Assessment WFL   RLE Assessment   RLE Assessment WFL  (grossly 4-/5)   LLE Assessment   LLE Assessment WFL  (groslsy 4-/5)   Bed Mobility Supine to Sit 4  Minimal assistance   Additional items Assist x 1; Increased time required;Verbal cues; Bedrails;HOB elevated   Sit to Supine 5  Supervision   Additional items Assist x 1; Increased time required;Verbal cues; Bedrails   Additional Comments Incresaed time and effort needed for supine to sit  Transfers   Sit to Stand 4  Minimal assistance   Additional items Assist x 1; Increased time required;Verbal cues   Stand to Sit 4  Minimal assistance   Additional items Assist x 1; Increased time required;Verbal cues;Armrests   Stand pivot 5  Supervision   Additional items Assist x 1; Increased time required;Verbal cues  (A for line management)   Additional Comments Initial sit to stand to OOB to chair with Orestes  S for SPT to return to bed  Ambulation/Elevation   Gait pattern Decreased foot clearance; Improper Weight shift   Gait Assistance 4  Minimal assist   Additional items Assist x 1   Assistive Device (none)   Distance 2-3 steps OOB to chair  Balance   Static Sitting Good   Dynamic Sitting Fair +   Static Standing Fair   Dynamic Standing Fair -   Ambulatory Fair -   Endurance Deficit   Endurance Deficit Yes   Endurance Deficit Description hypoxia with activity ntoed  On 3L O2 p transfers 81%  returns to 86-87% OOB in chair  Upon return to supine returns to 90% within 2-3 minutes  Activity Tolerance   Activity Tolerance Treatment limited secondary to medical complications (Comment)  (GALLOWAY, hypoxia)   Medical Staff Made Aware [facility]   Nurse Made Aware yes   Assessment   Prognosis Fair   Problem List Decreased strength;Decreased endurance; Impaired balance;Decreased mobility; Decreased cognition; Impaired judgement;Decreased safety awareness   Assessment Pt is 79 y/o female admitted with sepsis 2* PNA  PT consulted, up with assist   Baseline resides at [facility]  Reports independent with bed mobilty, transfers and locomotion via WC    States occasional ambulation of short distances within room without use of AD approximately 10-15 ft  Chronic use of O2 2-3L  Currently presenting with decreased general strength, balance, activity tolerance and mobilty  Transfers with Orestes/S  Decreased activity tolerance and GALLOWAY noted  O2 sats on 3L as low as 81% with activity but returns to 90% once returned to supine, HOB elevated and resting 2-3 minutes  Currently functioning slightly below baseline and will benefit from PT in order to optimize functional outcomes as well as improving strength, balance, activity tolerance and overall mobility  Will return to [facility] upon d/c and may benefit from continued therapy if continues to demonstrate function below baseline upon d/c  Patient of high complexity evaluation:  History: co - morbidities, fall risk, use of assistive device, assist for adl's, cognition, multiple lines  Exam: impairments in locomotion, musculoskeletal, balance,posture,   pulmonary, cognition  Clinical: unstable/unpredictable ( fall risk, chair/bed alarm, hypoxia with activity)  Complexity:high   Goals   Patient Goals to be able to do the things I was doing at [facility]   STG Expiration Date 08/09/18   Short Term Goal #1 10 days: 1)  Pt will perform bed mobility with Brady demonstrating appropriate technique 100% of the time in order to improve function  2)  Perform all transfers with Brady demonstrating safe and appropriate technique 100% of the time in order to improve ability to negotiate safely in home environment  3)Self propel WC > 150'x1 with mod I in order to demonstrate ability to negotiate in home environment  4)  Improve overall strength and balance 1/2 grade in order to optimize ability to perform functional tasks and reduce fall risk  5) Increase activity tolerance to 30 minutes in order to improve endurance to functional tasks  6) PT for ongoing patient and family/caregiver education, DME needs and d/c planning in order to promote highest level of function in least restrictive environment  7) Maintain O2 sats >90% in order to demonstrate improved tolerance to functional activity  8) Ambulate wtih appropriate AD 15'x1 with Brady in order to be able to ambulate to her wheelchair in her room  Treatment Day 0   Plan   Treatment/Interventions Functional transfer training;LE strengthening/ROM; Therapeutic exercise; Endurance training;Patient/family training;Equipment eval/education; Bed mobility;Gait training; Compensatory technique education;Continued evaluation;Spoke to nursing;OT   PT Frequency 2-3x/wk   Recommendation   Recommendation Home with family support;Long-term skilled nursing home placement;Long-term skilled PT  (return to [address] [place] [address] )   Equipment Recommended (has DME)   PT - OK to Discharge Yes  (when medically stable )   Modified Wyola Scale   Modified Wyola Scale 4   Barthel Index   Feeding 10   Bathing 0   Grooming Score 5   Dressing Score 5   Bladder Score 5   Bowels Score 10   Toilet Use Score 5   Transfers (Bed/Chair) Score 10   Mobility (Level Surface) Score 0   Stairs Score 0   Barthel Index Score 50       Jhon Giron, PT

## 2018-07-30 NOTE — PLAN OF CARE
Problem: OCCUPATIONAL THERAPY ADULT  Goal: Performs self-care activities at highest level of function for planned discharge setting  See evaluation for individualized goals  Treatment Interventions: ADL retraining, Functional transfer training, Endurance training, UE strengthening/ROM, Cognitive reorientation, Patient/family training, Equipment evaluation/education, Compensatory technique education, Energy conservation, Activityengagement          See flowsheet documentation for full assessment, interventions and recommendations  Limitation: Decreased ADL status, Decreased UE strength, Decreased Safe judgement during ADL, Decreased cognition, Decreased endurance, Decreased self-care trans, Decreased high-level ADLs  Prognosis: Good  Assessment: Pt is a 74 y o  female seen for OT evaluation s/p admit to [facility] on 7/29/2018 w/ coughing, Sepsis due to pneumonia ([facility] [address] )  Comorbidities affecting pt's functional performance at time of assessment include: HTN, COPD, ocular myasthenia gravis, depression, chronic respiratory failure on 2-3L O2 at baseline  Personal factors affecting pt at time of IE include: impaired cognition STM and insight, dyspnea  Prior to admission, pt was living at [facility] and reports independent w/ dressing, assist for showers, supervision mobility w/ propelling w/c and short distance walking w/ walker at times, assist IADLs  Upon evaluation: Pt requires supervision self-feeding, supervision grooming, MIN assist LB ADLS, MIN assist toileting, supervision-min assist bed mobility, supervision-min assist functional transfers 2* the following deficits impacting occupational performance: decreased strength and endurance, impaired balance, impaired activity tolerance, dyspnea w/ exertion (SPO2 on 3L w/ activity 81-90%, at rest in chair after mobility 86%), impaired cognition (STM, insight, safety awareness)   Pt to benefit from continued skilled OT tx while in the hospital to address deficits as defined above and maximize level of functional independence w ADL's and functional mobility  Occupational Performance areas to address include: grooming, bathing/shower, toilet hygiene, dressing, functional mobility and clothing management, energy conservation education, safety education  From OT standpoint, recommendation at time of d/c would be return to [facility] w/ OT services       OT Discharge Recommendation: Home OT (OT @ SNF)  OT - OK to Discharge:  (when medically stable)      Comments: Riley Gomez MS, OTR/L

## 2018-07-30 NOTE — OCCUPATIONAL THERAPY NOTE
[address]  Evaluation     Patient Name: Jacquelyn PICHARDO Date: 7/30/2018  Problem List  Patient Active Problem List   Diagnosis    Hypertension    Hypercholesterolemia    Epilepsy ([place] [address] )    COPD (chronic obstructive pulmonary disease) ([place] [address] )    Atrial septal defect    History of DVT (deep vein thrombosis)    CVA (cerebral vascular accident) ([place] [address] )    Cellulitis and abscess    [facility]    Chronic respiratory failure with hypoxia ([facility])    Protein-calorie malnutrition, moderate ([facility])    Myasthenia gravis, AChR antibody positive ([facility])    Cataract    Depression    Venous insufficiency    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[facility])    Ptosis of eyelid    Patent foramen ovale    Onychomycosis of toenail    Mild cognitive impairment with memory loss    Headache    GERD (gastroesophageal reflux disease)    Sepsis due to pneumonia ([place] [address] )    HCAP (healthcare-associated pneumonia)    Chronic anticoagulation     Past Medical History  Past Medical History:   Diagnosis Date    Atrial septal defect     COPD (chronic obstructive pulmonary disease) ([place] [address] )     CVA (cerebral vascular accident) ([place] [address] )     Epilepsy ([place] [address] )     History of DVT (deep vein thrombosis)     Hyperlipidemia     Hyperlipidemia     Hypertension      Past Surgical History  Past Surgical History:   Procedure Laterality Date    HYSTERECTOMY      VARICOSE VEIN SURGERY             07/30/18 0824   Note Type   Note type Eval/Treat   Restrictions/Precautions   Weight Bearing Precautions Per Order No   Other Precautions Fall Risk;Pain;Multiple lines;Telemetry; Chair Alarm;Cognitive;O2;Bed Alarm   Pain Assessment   Pain Assessment No/denies pain   Pain Score No Pain   Home Living   Type of Home SNF  ([facility])   Home Layout One level   Bathroom Shower/Tub Walk-in shower   Bathroom Toilet Standard   Bathroom Equipment Grab bars in shower; Shower chair;Grab bars around toilet   Bathroom Accessibility Accessible   Home Equipment Walker; Wheelchair-manual   Additional Comments pt reports mainly uses w/c for mobility and propels self; reports living at [place] [address] for past 5 years   Prior Function   Level of [place] Independent with ADLs and functional mobility; Needs assistance with IADLs;Modified independent with wheelchair  (supervision showers)   Lives With Facility staff   Receives Help From Family   ADL Assistance Needs assistance  (independent dressing, supervision showers)   IADLs Needs assistance   Falls in the last 6 months 0   Vocational Retired   Comments pt reports 2-3L O2 at baseline; pt reports short distance mobility w/ and w/o RW   Lifestyle   Autonomy per pt independent dressing, supervision showers, supervision functional mobility w/ w/c propulsion and supervision short distance mobility several feet w/ RW   Reciprocal Relationships facility staff and friends   [employer] retired cleaned [address] getting her tea and sitting and chatting w/ friends   ADL   [address] 5  Supervision/Setup   Grooming Assistance 5  [address] [facility] 5  Supervision/Setup   LB [address] 4  Minimal [address] 5  Supervision/Setup    [facility] 4  [address]  4  Minimal Assistance   Bed Mobility   Supine to Sit 4  Minimal assistance   Additional items Assist x 1; Increased time required;Verbal cues; Bedrails;HOB elevated   Sit to Supine 5  Supervision   Additional items Assist x 1; Increased time required;Verbal cues   Additional Comments increased time to complete   Transfers   Sit to Stand 4  Minimal assistance   Additional items Assist x 1; Increased time required;Verbal cues;Armrests   Stand to Sit 4  Minimal assistance   Additional items Assist x 1; Increased time required;Verbal cues;Armrests   Stand pivot 4  Minimal assistance   Additional items Assist x 1; Increased time required;Verbal cues;Armrests   Additional Comments VCs for positioning and safety   Functional Mobility   Functional Mobility 4  Minimal assistance   Additional Comments assist x1   Balance   Static Sitting Good   Dynamic Sitting Fair +   Static Standing Fair   Dynamic Standing Fair -   Ambulatory Fair -   Activity Tolerance   Activity Tolerance Patient limited by fatigue;Treatment limited secondary to medical complications (Comment)   Nurse Made Aware appropriate to see per Renuka PETERSEN Assessment   RUE Assessment WFL  (3+/5)   LUE Assessment   LUE Assessment WFL  (4-/5)   Hand Function   Gross Motor Coordination Functional   Fine Motor Coordination Impaired   Sensation   Light Touch No apparent deficits   Vision-Basic Assessment   Current Vision No visual deficits   Visual History (ocular myasthenia gravis)   Vision - Complex Assessment   Ocular Range of Motion WFL   Acuity Able to read clock/calendar on wall without difficulty   Perception   Inattention/Neglect Appears intact   Cognition   Overall Cognitive Status Impaired   Arousal/Participation Cooperative;Responsive   Attention Attends with cues to redirect   Orientation Level Oriented to person;Disoriented to place; Disoriented to time;Disoriented to situation   Memory Decreased short term memory;Decreased recall of precautions;Decreased recall of recent events   Following Commands Follows one step commands with increased time or repetition   Comments pt w/ STM deficits at baseline, cues for safety awareness; h/o CVA, pt anxious and tearful about wanting to return to [place] [id number] soon   Assessment   Limitation Decreased ADL status; Decreased UE strength;Decreased Safe judgement during ADL;Decreased cognition;Decreased endurance;Decreased self-care trans;Decreased high-level ADLs   Prognosis Good   Assessment Pt is a 74 y o  female seen for OT evaluation s/p admit to [facility] on 7/29/2018 w/ coughing, Sepsis due to pneumonia ([facility] [address] )  Comorbidities affecting pt's functional performance at time of assessment include: HTN, COPD, ocular myasthenia gravis, depression, chronic respiratory failure on 2-3L O2 at baseline  Personal factors affecting pt at time of IE include: impaired cognition STM and insight, dyspnea  Prior to admission, pt was living at [facility] and reports independent w/ dressing, assist for showers, supervision mobility w/ propelling w/c and short distance walking w/ walker at times, assist IADLs  Upon evaluation: Pt requires supervision self-feeding, supervision grooming, MIN assist LB ADLS, MIN assist toileting, supervision-min assist bed mobility, supervision-min assist functional transfers 2* the following deficits impacting occupational performance: decreased strength and endurance, impaired balance, impaired activity tolerance, dyspnea w/ exertion (SPO2 on 3L w/ activity 81-90%, at rest in chair after mobility 86%), impaired cognition (STM, insight, safety awareness)  Pt to benefit from continued skilled OT tx while in the hospital to address deficits as defined above and maximize level of functional independence w ADL's and functional mobility  Occupational Performance areas to address include: grooming, bathing/shower, toilet hygiene, dressing, functional mobility and clothing management, energy conservation education, safety education  From OT standpoint, recommendation at time of d/c would be return to [facility] w/ OT services  Goals   Patient Goals "to go back to [facility] and do the activities I was doing before"   LTG Time Frame 7-10   Long Term Goal please see below goals   Plan   Treatment Interventions ADL retraining;Functional transfer training; Endurance training;UE strengthening/ROM; Cognitive reorientation;Patient/family training;Equipment evaluation/education; Compensatory technique education; Energy conservation; Activityengagement   Goal Expiration Date 08/09/18   OT Frequency 2-3x/wk Recommendation   OT Discharge Recommendation Home OT  (OT @ SNF)   OT - OK to Discharge (when medically stable)   Barthel Index   Feeding 10   Bathing 0   Grooming Score 5   Dressing Score 5   Bladder Score 5   Bowels Score 10   Toilet Use Score 5   Transfers (Bed/Chair) Score 10   Mobility (Level Surface) Score 0   Stairs Score 0   Barthel Index Score 50   Modified Troup Scale   Modified Troup Scale 4     Occupational Therapy Goals to be met in 7-10 days:  1) Pt will improve activity tolerance to G for min 30 min txment sessions  2) Pt will complete ADLs/self care w/ supervision  3) Pt will complete toileting w/ supervison w/ G hygiene/thoroughness using DME PRN  4) Pt will improve functional transfers on/off all surfaces using DME PRN w/ G balance/safety including toileting w/ supervision  5) Pt will improve fx'l mobility during I/ADl/leisure tasks using DME PRN w/ g balance/safety w/ supervision  6) Pt will engage in ongoing cognitive assessment w/ G participation to A w/ safe d/c planning/recommendations  7) Pt will demonstrate G carryover of pt/caregiver education and training as appropriate w/ mod I  w/ G tolerance  8) Pt will demonstrate 100% carryover of E C  techniques w/ mod I t/o fx'l I/ADL/leisure tasks w/o cues s/p skilled education  9) Pt will demonstrate improved b/l UE strength by 1 MMT grade to enhance ADLS and functional transfers and return to activities she enjoys    Documentation completed by: Eusebio Pardo MS, OTR/L

## 2018-07-30 NOTE — PLAN OF CARE
Problem: PHYSICAL THERAPY ADULT  Goal: Performs mobility at highest level of function for planned discharge setting  See evaluation for individualized goals  Treatment/Interventions: Functional transfer training, LE strengthening/ROM, Therapeutic exercise, Endurance training, Patient/family training, Equipment eval/education, Bed mobility, Gait training, Compensatory technique education, Continued evaluation, Spoke to nursing, OT  Equipment Recommended:  (has DME)       See flowsheet documentation for full assessment, interventions and recommendations  Outcome: Progressing  Prognosis: Fair  Problem List: Decreased strength, Decreased endurance, Impaired balance, Decreased mobility, Decreased cognition, Impaired judgement, Decreased safety awareness  Assessment: Pt is 77 y/o female admitted with sepsis 2* PNA  PT consulted, up with assist   Baseline resides at [facility]  Reports independent with bed mobilty, transfers and locomotion via WC  States occasional ambulation of short distances within room without use of AD approximately 10-15 ft  Chronic use of O2 2-3L  Currently presenting with decreased general strength, balance, activity tolerance and mobilty  Transfers with Orestes/S  Decreased activity tolerance and GALLOWAY noted  O2 sats on 3L as low as 81% with activity but returns to 90% once returned to supine, HOB elevated and resting 2-3 minutes  Currently functioning slightly below baseline and will benefit from PT in order to optimize functional outcomes as well as improving strength, balance, activity tolerance and overall mobility  Will return to [facility] upon d/c and may benefit from continued therapy if continues to demonstrate function below baseline upon d/c  Patient of high complexity evaluation:  History: co - morbidities, fall risk, use of assistive device, assist for adl's, cognition, multiple lines   Exam: impairments in locomotion, musculoskeletal, balance,posture,   pulmonary, cognition  Clinical: unstable/unpredictable ( fall risk, chair/bed alarm, hypoxia with activity)  Complexity:high        Recommendation: Home with family support, Long-term skilled nursing home placement, Long-term skilled PT (return to [facility]  )     PT - OK to Discharge: Yes (when medically stable )    See flowsheet documentation for full assessment

## 2018-07-30 NOTE — PLAN OF CARE
Problem: DISCHARGE PLANNING - CARE MANAGEMENT  Goal: Discharge to post-acute care or home with appropriate resources  INTERVENTIONS:  - Conduct assessment to determine patient/family and health care team treatment goals, and need for post-acute services based on payer coverage, community resources, and patient preferences, and barriers to discharge  - Address psychosocial, clinical, and financial barriers to discharge as identified in assessment in conjunction with the patient/family and health care team  - Arrange appropriate level of post-acute services according to patients   needs and preference and payer coverage in collaboration with the physician and health care team  - Communicate with and update the patient/family, physician, and health care team regarding progress on the discharge plan  - Arrange appropriate transportation to post-acute venues  Outcome: Progressing  Pt is from [facility] SNF 15 day MA bed hold

## 2018-07-30 NOTE — PROGRESS NOTES
Progress Note - Jennifer Asa 1945, 74 y o  female MRN: [id number]    Unit/Bed#: E4 -01 Encounter: [id number]    Primary Care Provider: Nasima Bates MD   Date and time admitted to hospital: 2018 12:22 PM        * Sepsis due to pneumonia [facility])   Assessment & Plan    Failed outpt treatment with pneumonia  Improving on Cefepime and Vancomycin  Treating as health care associated pneumonia as she comes from a Nursing home  Possible MRSA and gram negative pneumonia  She chronicallly wears oxygen          Chronic anticoagulation   Assessment & Plan    Has a supratherapeutic INR on admission  Coumadin is on hold  Check INR now        Chronic respiratory failure with hypoxia (HCC)   Assessment & Plan    Baseline is 3 liters oxygen        COPD (chronic obstructive pulmonary disease) ([facility])   Assessment & Plan    Has chronic respiratory failure and chronically wears oxygen              Subjective:   "I'm feeling better"    Less sob  Less cough  No fever or chills        Objective:     Vitals:   Temp (24hrs), Av 6 °F (37 °C), Min:97 °F (36 1 °C), Max:101 °F (38 3 °C)    HR:  [] 77  Resp:  [18-38] 20  BP: (101-118)/(58-78) 118/68  SpO2:  [92 %-97 %] 94 %  Body mass index is 20 72 kg/m²  Input and Output Summary (last 24 hours): Intake/Output Summary (Last 24 hours) at 18 0939  Last data filed at 18 0921   Gross per 24 hour   Intake              360 ml   Output                0 ml   Net              360 ml       Physical Exam:     Physical Exam   HENT:   Head: Normocephalic and atraumatic  Eyes: EOM are normal  Pupils are equal, round, and reactive to light  Cardiovascular: Normal rate and regular rhythm  Exam reveals no gallop and no friction rub  No murmur heard  Pulmonary/Chest: Effort normal  She has no wheezes  She has no rales  Mild rhonchi on left lower chest   Abdominal: Soft  There is no tenderness  Musculoskeletal: She exhibits no edema     Nursing note and vitals reviewed              Additional Data:     Labs:      Results from last 7 days  Lab Units 07/30/18  0436 07/29/18  1237   WBC Thousand/uL 15 22* 19 75*   HEMOGLOBIN g/dL 8 0* 9 8*   HEMATOCRIT % 25 0* 30 0*   PLATELETS Thousands/uL 430* 574*   NEUTROS PCT %  --  82*   LYMPHS PCT %  --  9*   LYMPHO PCT % 5*  --    MONOS PCT %  --  9   MONO PCT MAN % 3*  --    EOS PCT %  --  0   EOSINO PCT MANUAL % 0  --        Results from last 7 days  Lab Units 07/30/18  0436 07/29/18  1237   SODIUM mmol/L 135* 131*   POTASSIUM mmol/L 4 0 4 1   CHLORIDE mmol/L 106 97*   CO2 mmol/L 21 23   BUN mg/dL 16 16   CREATININE mg/dL 0 91 1 08   CALCIUM mg/dL 8 8 9 2   TOTAL PROTEIN g/dL  --  7 7   BILIRUBIN TOTAL mg/dL  --  0 65   ALK PHOS U/L  --  253*   ALT U/L  --  43   AST U/L  --  34   GLUCOSE RANDOM mg/dL 140 102       Results from last 7 days  Lab Units 07/29/18  1237   INR  4 02*                   * I Have Reviewed All Lab Data     Recent Cultures (last 7 days):       Results from last 7 days  Lab Units 07/29/18  1636   LEGIONELLA URINARY ANTIGEN  Negative         Last 24 Hours Medication List:     Current Facility-Administered Medications:  acetaminophen 650 mg Oral Q6H PRN Corrine Reyes MD    benzonatate 100 mg Oral TID PRN Corrine Reyes MD    budesonide-formoterol 2 puff Inhalation BID Corrine Reyes MD    cefepime 1,000 mg Intravenous Q12H Corrine Reyes MD Last Rate: 1,000 mg (48/39/02 2361)   folic acid 1 mg Oral Daily Corrine Reyes MD    guaiFENesin 200 mg Oral Q4H PRN Corrine Reyes MD    levalbuterol 0 63 mg Nebulization Q6H PRN Corrine Reyes MD    levETIRAcetam 750 mg Oral Q12H Corrine Reyes MD    lisinopril 5 mg Oral Daily Corrine Reyes MD    oxybutynin 5 mg Oral Daily Corrine Reyes MD    polyethylene glycol 17 g Oral Daily PRN Corrine Reyes MD    pravastatin 10 mg Oral Daily With Gail Razo MD    pyridostigmine 30 mg Oral TID Corrine Reyes MD    vitamin B-6 25 mg Oral Daily Corrine Reyes MD    sodium chloride 125 mL/hr Intravenous Continuous Mariano Magallon MD Last Rate: 125 mL/hr (07/30/18 0834)   thiamine 100 mg Oral Daily Loreta Lee MD    tiotropium 18 mcg Inhalation Daily Loreta Lee MD    vancomycin 750 mg Intravenous Q24H Loreta Lee MD          VTE Pharmacologic Prophylaxis:   Pharmacologic: Warfarin (Coumadin)    Time Spent for Care: 20 minutes  More than 50% of total time spent on counseling and coordination of care as described above  Current Length of Stay: 1 day(s)    Current Patient Status: Inpatient       Discharge Plan: back to [facility] likely in 2 days    Code Status: Level 3 - DNAR and DNI           Today, Patient Was Seen By: Yuly Carpio DO    ** Please Note: Dictation voice to text software may have been used in the creation of this document   **

## 2018-07-30 NOTE — PLAN OF CARE
Problem: DISCHARGE PLANNING - CARE MANAGEMENT  Goal: Discharge to post-acute care or home with appropriate resources  INTERVENTIONS:  - Conduct assessment to determine patient/family and health care team treatment goals, and need for post-acute services based on payer coverage, community resources, and patient preferences, and barriers to discharge  - Address psychosocial, clinical, and financial barriers to discharge as identified in assessment in conjunction with the patient/family and health care team  - Arrange appropriate level of post-acute services according to patients   needs and preference and payer coverage in collaboration with the physician and health care team  - Communicate with and update the patient/family, physician, and health care team regarding progress on the discharge plan  - Arrange appropriate transportation to post-acute venues   Outcome: Progressing   Pt will need transport set up to return to [facility]

## 2018-07-30 NOTE — PLAN OF CARE
Problem: Potential for Falls  Goal: Patient will remain free of falls  INTERVENTIONS:  - Assess patient frequently for physical needs  -  Identify cognitive and physical deficits and behaviors that affect risk of falls    -  [place] fall precautions as indicated by assessment   - Educate patient/family on patient safety including physical limitations  - Instruct patient to call for assistance with activity based on assessment  - Modify environment to reduce risk of injury  - Consider OT/PT consult to assist with strengthening/mobility   Outcome: Progressing      Problem: Prexisting or High Potential for Compromised Skin Integrity  Goal: Skin integrity is maintained or improved  INTERVENTIONS:  - Identify patients at risk for skin breakdown  - Assess and monitor skin integrity  - Assess and monitor nutrition and hydration status  - Monitor labs (i e  albumin)  - Assess for incontinence   - Turn and reposition patient  - Assist with mobility/ambulation  - Relieve pressure over bony prominences  - Avoid friction and shearing  - Provide appropriate hygiene as needed including keeping skin clean and dry  - Evaluate need for skin moisturizer/barrier cream  - Collaborate with interdisciplinary team (i e  Nutrition, Rehabilitation, etc )   - Patient/family teaching   Outcome: Progressing      Problem: PAIN - ADULT  Goal: Verbalizes/displays adequate comfort level or baseline comfort level  Interventions:  - Encourage patient to monitor pain and request assistance  - Assess pain using appropriate pain scale  - Administer analgesics based on type and severity of pain and evaluate response  - Implement non-pharmacological measures as appropriate and evaluate response  - Consider cultural and social influences on pain and pain management  - Notify physician/advanced practitioner if interventions unsuccessful or patient reports new pain  Outcome: Progressing      Problem: INFECTION - ADULT  Goal: Absence or prevention of progression during hospitalization  INTERVENTIONS:  - Assess and monitor for signs and symptoms of infection  - Monitor lab/diagnostic results  - Monitor all insertion sites, i e  indwelling lines, tubes, and drains  - Monitor endotracheal (as able) and nasal secretions for changes in amount and color  - [place] appropriate cooling/warming therapies per order  - Administer medications as ordered  - Instruct and encourage patient and family to use good hand hygiene technique  - Identify and instruct in appropriate isolation precautions for identified infection/condition  Outcome: Progressing    Goal: Absence of fever/infection during neutropenic period  INTERVENTIONS:  - Monitor WBC  - Implement neutropenic guidelines  Outcome: Progressing      Problem: SAFETY ADULT  Goal: Patient will remain free of falls  INTERVENTIONS:  - Assess patient frequently for physical needs  -  Identify cognitive and physical deficits and behaviors that affect risk of falls    -  [place] fall precautions as indicated by assessment   - Educate patient/family on patient safety including physical limitations  - Instruct patient to call for assistance with activity based on assessment  - Modify environment to reduce risk of injury  - Consider OT/PT consult to assist with strengthening/mobility   Outcome: Progressing    Goal: Maintain or return to baseline ADL function  INTERVENTIONS:  -  Assess patient's ability to carry out ADLs; assess patient's baseline for ADL function and identify physical deficits which impact ability to perform ADLs (bathing, care of mouth/teeth, toileting, grooming, dressing, etc )  - Assess/evaluate cause of self-care deficits   - Assess range of motion  - Assess patient's mobility; develop plan if impaired  - Assess patient's need for assistive devices and provide as appropriate  - Encourage maximum independence but intervene and supervise when necessary  ¯ Involve family in performance of ADLs  ¯ Assess for home care needs following discharge   ¯ Request OT consult to assist with ADL evaluation and planning for discharge  ¯ Provide patient education as appropriate  Outcome: Progressing    Goal: Maintain or return mobility status to optimal level  INTERVENTIONS:  - Assess patient's baseline mobility status (ambulation, transfers, stairs, etc )    - Identify cognitive and physical deficits and behaviors that affect mobility  - Identify mobility aids required to assist with transfers and/or ambulation (gait belt, sit-to-stand, lift, walker, cane, etc )  - [place] fall precautions as indicated by assessment  - Record patient progress and toleration of activity level on Mobility SBAR; progress patient to next Phase/Stage  - Instruct patient to call for assistance with activity based on assessment  - Request Rehabilitation consult to assist with strengthening/weightbearing, etc   Outcome: Progressing      Problem: DISCHARGE PLANNING  Goal: Discharge to home or other facility with appropriate resources  INTERVENTIONS:  - Identify barriers to discharge w/patient and caregiver  - Arrange for needed discharge resources and transportation as appropriate  - Identify discharge learning needs (meds, wound care, etc )  - Arrange for interpretive services to assist at discharge as needed  - Refer to Case Management Department for coordinating discharge planning if the patient needs post-hospital services based on physician/advanced practitioner order or complex needs related to functional status, cognitive ability, or social support system  Outcome: Progressing      Problem: Knowledge Deficit  Goal: Patient/family/caregiver demonstrates understanding of disease process, treatment plan, medications, and discharge instructions  Complete learning assessment and assess knowledge base    Interventions:  - Provide teaching at level of understanding  - Provide teaching via preferred learning methods  Outcome: Progressing

## 2018-07-30 NOTE — SOCIAL WORK
Met with pt to completed assessment and explain role  PCP is Dr Bry Serrano  Pt is a long term pt at [facility] for about 4-5 years  Pt is currently a MA bed hold  Pt can wash herself once set up and propels self in w/c  DME: w/c and 02 from the SNF  Pt has a hx of Epilepsy, but no D&A or MH  Pt does not have POA/Living Will and did not want any information   is her dtr [place] at [phone]  Pt will need transport set up to return to [facility]

## 2018-07-30 NOTE — ASSESSMENT & PLAN NOTE
Failed outpt treatment with pneumonia  Improving on Cefepime and Vancomycin  Treating as health care associated pneumonia as she comes from a Nursing home  Possible MRSA and gram negative pneumonia    She chronicallly wears oxygen

## 2018-07-31 LAB
ANION GAP SERPL CALCULATED.3IONS-SCNC: 7 MMOL/L (ref 4–13)
ATRIAL RATE: 121 BPM
BASOPHILS # BLD AUTO: 0.02 THOUSANDS/ΜL (ref 0–0.1)
BASOPHILS NFR BLD AUTO: 0 % (ref 0–1)
BUN SERPL-MCNC: 18 MG/DL (ref 5–25)
CALCIUM SERPL-MCNC: 8.6 MG/DL (ref 8.3–10.1)
CHLORIDE SERPL-SCNC: 108 MMOL/L (ref 100–108)
CO2 SERPL-SCNC: 22 MMOL/L (ref 21–32)
CREAT SERPL-MCNC: 0.89 MG/DL (ref 0.6–1.3)
EOSINOPHIL # BLD AUTO: 0.03 THOUSAND/ΜL (ref 0–0.61)
EOSINOPHIL NFR BLD AUTO: 0 % (ref 0–6)
ERYTHROCYTE [DISTWIDTH] IN BLOOD BY AUTOMATED COUNT: 14.3 % (ref 11.6–15.1)
GFR SERPL CREATININE-BSD FRML MDRD: 64 ML/MIN/1.73SQ M
GLUCOSE SERPL-MCNC: 81 MG/DL (ref 65–140)
HCT VFR BLD AUTO: 26.1 % (ref 34.8–46.1)
HGB BLD-MCNC: 8.1 G/DL (ref 11.5–15.4)
INR PPP: 4.98 (ref 0.86–1.17)
LYMPHOCYTES # BLD AUTO: 2.43 THOUSANDS/ΜL (ref 0.6–4.47)
LYMPHOCYTES NFR BLD AUTO: 12 % (ref 14–44)
MAGNESIUM SERPL-MCNC: 1.8 MG/DL (ref 1.6–2.6)
MCH RBC QN AUTO: 30 PG (ref 26.8–34.3)
MCHC RBC AUTO-ENTMCNC: 31 G/DL (ref 31.4–37.4)
MCV RBC AUTO: 97 FL (ref 82–98)
MONOCYTES # BLD AUTO: 1.12 THOUSAND/ΜL (ref 0.17–1.22)
MONOCYTES NFR BLD AUTO: 6 % (ref 4–12)
MRSA NOSE QL CULT: ABNORMAL
MRSA NOSE QL CULT: ABNORMAL
NEUTROPHILS # BLD AUTO: 16.48 THOUSANDS/ΜL (ref 1.85–7.62)
NEUTS SEG NFR BLD AUTO: 82 % (ref 43–75)
NRBC BLD AUTO-RTO: 0 /100 WBCS
P AXIS: 67 DEGREES
PLATELET # BLD AUTO: 532 THOUSANDS/UL (ref 149–390)
PMV BLD AUTO: 9.4 FL (ref 8.9–12.7)
POTASSIUM SERPL-SCNC: 3.7 MMOL/L (ref 3.5–5.3)
PR INTERVAL: 124 MS
PROTHROMBIN TIME: 46.2 SECONDS (ref 11.8–14.2)
QRS AXIS: -31 DEGREES
QRSD INTERVAL: 88 MS
QT INTERVAL: 304 MS
QTC INTERVAL: 431 MS
RBC # BLD AUTO: 2.7 MILLION/UL (ref 3.81–5.12)
SODIUM SERPL-SCNC: 137 MMOL/L (ref 136–145)
T WAVE AXIS: 55 DEGREES
VENTRICULAR RATE: 121 BPM
WBC # BLD AUTO: 20.08 THOUSAND/UL (ref 4.31–10.16)

## 2018-07-31 PROCEDURE — 97530 THERAPEUTIC ACTIVITIES: CPT

## 2018-07-31 PROCEDURE — 85025 COMPLETE CBC W/AUTO DIFF WBC: CPT | Performed by: HOSPITALIST

## 2018-07-31 PROCEDURE — 85610 PROTHROMBIN TIME: CPT | Performed by: HOSPITALIST

## 2018-07-31 PROCEDURE — 80048 BASIC METABOLIC PNL TOTAL CA: CPT | Performed by: HOSPITALIST

## 2018-07-31 PROCEDURE — 83735 ASSAY OF MAGNESIUM: CPT | Performed by: HOSPITALIST

## 2018-07-31 PROCEDURE — 99232 SBSQ HOSP IP/OBS MODERATE 35: CPT | Performed by: HOSPITALIST

## 2018-07-31 PROCEDURE — 93010 ELECTROCARDIOGRAM REPORT: CPT | Performed by: INTERNAL MEDICINE

## 2018-07-31 RX ORDER — ALBUTEROL SULFATE 90 UG/1
2 AEROSOL, METERED RESPIRATORY (INHALATION) EVERY 4 HOURS PRN
Status: DISCONTINUED | OUTPATIENT
Start: 2018-07-31 | End: 2018-08-01 | Stop reason: HOSPADM

## 2018-07-31 RX ADMIN — LISINOPRIL 5 MG: 5 TABLET ORAL at 08:51

## 2018-07-31 RX ADMIN — FOLIC ACID 1 MG: 1 TABLET ORAL at 08:50

## 2018-07-31 RX ADMIN — OXYBUTYNIN CHLORIDE 5 MG: 10 TABLET, EXTENDED RELEASE ORAL at 08:49

## 2018-07-31 RX ADMIN — CEFEPIME HYDROCHLORIDE 1000 MG: 1 INJECTION, SOLUTION INTRAVENOUS at 13:19

## 2018-07-31 RX ADMIN — LEVETIRACETAM 750 MG: 500 TABLET ORAL at 17:04

## 2018-07-31 RX ADMIN — BUDESONIDE AND FORMOTEROL FUMARATE DIHYDRATE 2 PUFF: 160; 4.5 AEROSOL RESPIRATORY (INHALATION) at 08:49

## 2018-07-31 RX ADMIN — CEFEPIME HYDROCHLORIDE 1000 MG: 1 INJECTION, SOLUTION INTRAVENOUS at 01:31

## 2018-07-31 RX ADMIN — BUDESONIDE AND FORMOTEROL FUMARATE DIHYDRATE 2 PUFF: 160; 4.5 AEROSOL RESPIRATORY (INHALATION) at 17:04

## 2018-07-31 RX ADMIN — LEVETIRACETAM 750 MG: 500 TABLET ORAL at 05:07

## 2018-07-31 RX ADMIN — PYRIDOSTIGMINE BROMIDE 30 MG: 60 TABLET ORAL at 17:04

## 2018-07-31 RX ADMIN — VANCOMYCIN HYDROCHLORIDE 750 MG: 750 INJECTION, SOLUTION INTRAVENOUS at 14:52

## 2018-07-31 RX ADMIN — PYRIDOSTIGMINE BROMIDE 30 MG: 60 TABLET ORAL at 08:50

## 2018-07-31 RX ADMIN — Medication 100 MG: at 08:51

## 2018-07-31 RX ADMIN — SODIUM CHLORIDE 125 ML/HR: 0.9 INJECTION, SOLUTION INTRAVENOUS at 05:15

## 2018-07-31 RX ADMIN — PYRIDOSTIGMINE BROMIDE 30 MG: 60 TABLET ORAL at 21:10

## 2018-07-31 RX ADMIN — SODIUM CHLORIDE 125 ML/HR: 0.9 INJECTION, SOLUTION INTRAVENOUS at 13:32

## 2018-07-31 RX ADMIN — TIOTROPIUM BROMIDE 18 MCG: 18 CAPSULE ORAL; RESPIRATORY (INHALATION) at 08:49

## 2018-07-31 RX ADMIN — Medication 25 MG: at 08:50

## 2018-07-31 RX ADMIN — BENZONATATE 100 MG: 100 CAPSULE ORAL at 17:04

## 2018-07-31 RX ADMIN — PRAVASTATIN SODIUM 10 MG: 10 TABLET ORAL at 17:04

## 2018-07-31 NOTE — PROGRESS NOTES
Progress Note - Ximena James 1945, 74 y o  female MRN: [id number]    Unit/Bed#: E4 -01 Encounter: [id number]    Primary Care Provider: Renato Wong MD   Date and time admitted to hospital: 2018 12:22 PM        * Sepsis due to pneumonia [facility])   Assessment & Plan    Getting better  She is on Cefepime and Vanco  There is concern for MRSA  Start to decrease IV fluids          Chronic respiratory failure with hypoxia (HCC)   Assessment & Plan    Baseline is 3 liters oxygen              Subjective:   Getting better  Coughing up some clear mucous  Less sob  No fever  No rigors      Objective:     Vitals:   Temp (24hrs), Av °F (36 7 °C), Min:97 6 °F (36 4 °C), Max:98 6 °F (37 °C)    HR:  [] 78  Resp:  [20] 20  BP: (116-154)/(61-78) 148/78  SpO2:  [93 %-96 %] 96 %  Body mass index is 20 72 kg/m²  Input and Output Summary (last 24 hours):     No intake or output data in the 24 hours ending 18 1120    Physical Exam:     Physical Exam   HENT:   Head: Normocephalic and atraumatic  Eyes: EOM are normal  Pupils are equal, round, and reactive to light  Cardiovascular: Normal rate and regular rhythm  Exam reveals no gallop and no friction rub  No murmur heard  Pulmonary/Chest: She has no wheezes  She has no rales  scatterred rhonchi on the left   Abdominal: Soft  There is no tenderness  Musculoskeletal: She exhibits no edema  Nursing note and vitals reviewed              Additional Data:     Labs:      Results from last 7 days  Lab Units 18  0537   WBC Thousand/uL 20 08*   HEMOGLOBIN g/dL 8 1*   HEMATOCRIT % 26 1*   PLATELETS Thousands/uL 532*   NEUTROS PCT % 82*   LYMPHS PCT % 12*   MONOS PCT % 6   EOS PCT % 0       Results from last 7 days  Lab Units 18  0537  18  1237   SODIUM mmol/L 137  < > 131*   POTASSIUM mmol/L 3 7  < > 4 1   CHLORIDE mmol/L 108  < > 97*   CO2 mmol/L 22  < > 23   BUN mg/dL 18  < > 16   CREATININE mg/dL 0 89  < > 1 08   CALCIUM mg/dL 8 6  < > 9 2   TOTAL PROTEIN g/dL  --   --  7 7   BILIRUBIN TOTAL mg/dL  --   --  0 65   ALK PHOS U/L  --   --  253*   ALT U/L  --   --  43   AST U/L  --   --  34   GLUCOSE RANDOM mg/dL 81  < > 102   < > = values in this interval not displayed  Results from last 7 days  Lab Units 07/31/18  0537   INR  4 98*                   * I Have Reviewed All Lab Data     Recent Cultures (last 7 days):       Results from last 7 days  Lab Units 07/29/18  1636 07/29/18  1239 07/29/18  1237   BLOOD CULTURE   --  No Growth at 24 hrs  No Growth at 24 hrs  LEGIONELLA URINARY ANTIGEN  Negative  --   --          Last 24 Hours Medication List:     Current Facility-Administered Medications:  acetaminophen 650 mg Oral Q6H PRN Radha Harmon MD    benzonatate 100 mg Oral TID PRN Radha Harmon MD    budesonide-formoterol 2 puff Inhalation BID Radha Harmon MD    cefepime 1,000 mg Intravenous Q12H Radha Harmon MD Last Rate: 1,000 mg (72/62/73 1683)   folic acid 1 mg Oral Daily Radha Harmon MD    guaiFENesin 200 mg Oral Q4H PRN Radha Harmon MD    levalbuterol 0 63 mg Nebulization Q6H PRN Radha Harmon MD    levETIRAcetam 750 mg Oral Q12H Radha Harmon MD    lisinopril 5 mg Oral Daily Radha Harmon MD    oxybutynin 5 mg Oral Daily Radha Harmon MD    polyethylene glycol 17 g Oral Daily PRN Radha Harmon MD    pravastatin 10 mg Oral Daily With Krystle Bustillo MD    pyridostigmine 30 mg Oral TID Radha Harmon MD    vitamin B-6 25 mg Oral Daily Radha Harmon MD    sodium chloride 125 mL/hr Intravenous Continuous Otjocelyne Lau MD Last Rate: 125 mL/hr (07/31/18 0515)   thiamine 100 mg Oral Daily Radha Harmon MD    tiotropium 18 mcg Inhalation Daily Radha Harmon MD    vancomycin 750 mg Intravenous Q24H Radha Harmon MD Last Rate: 750 mg (07/30/18 1426)         VTE Pharmacologic Prophylaxis:   Pharmacologic:   Time Spent for Care: 20 minutes  More than 50% of total time spent on counseling and coordination of care as described above      Current Length of Stay: 2 day(s)    Current Patient Status: Inpatient       Discharge Plan: hopefully discharge tomorrow    Code Status: Level 3 - DNAR and DNI           Today, Patient Was Seen By: Manuelito Stout DO    ** Please Note: Dictation voice to text software may have been used in the creation of this document   **

## 2018-07-31 NOTE — PLAN OF CARE
Problem: PHYSICAL THERAPY ADULT  Goal: Performs mobility at highest level of function for planned discharge setting  See evaluation for individualized goals  Treatment/Interventions: Functional transfer training, LE strengthening/ROM, Therapeutic exercise, Endurance training, Patient/family training, Equipment eval/education, Bed mobility, Gait training, Compensatory technique education, Continued evaluation, Spoke to nursing, OT  Equipment Recommended:  (has DME)       See flowsheet documentation for full assessment, interventions and recommendations  Outcome: Progressing  Prognosis: Fair  Problem List: Decreased strength, Decreased endurance, Impaired balance, Decreased mobility, Decreased cognition, Impaired judgement, Decreased safety awareness  Assessment: Seen for progression of PT  Session limited 2* desire to participate  Reports fatigue and OOB x 2 today with nsg, just returning to bed prior to PT  Sats on 3L range 83-90% wiht conversation  Encouraged proper breathing  Education given about benefits of PT and importance of progressive mobiltiy  Receptive to few bedlevel exercise with return demo, however declines cotinuation of full LE ther ex program at this time  Will attempt to follow and progress as able  Cont to rec return to SNF upon d/c  Recommendation:  (return to [facility])     PT - OK to Discharge: Yes    See flowsheet documentation for full assessment

## 2018-07-31 NOTE — ASSESSMENT & PLAN NOTE
Getting better  She is on Cefepime and Vanco  There is concern for MRSA  Start to decrease IV fluids

## 2018-07-31 NOTE — PHYSICAL THERAPY NOTE
PHYSICAL THERAPY NOTE          Patient Name: Jennifer House  [id number] Date: 7/31/2018 07/31/18 5878   Pain Assessment   Pain Score No Pain   Restrictions/Precautions   Weight Bearing Precautions Per Order No   Other Precautions Fall Risk;Multiple lines;O2;Chair Alarm; Bed Alarm   General   Chart Reviewed Yes   Response to Previous Treatment Patient with no complaints from previous session  Family/Caregiver Present No   Cognition   Overall Cognitive Status Impaired   Arousal/Participation Alert   Subjective   Subjective I dont want to do therapy  I just go back to bed  Receptive to education   Endurance Deficit   Endurance Deficit Yes   Endurance Deficit Description hypoxia  flucutations with conversation 83-90% with 3L   Activity Tolerance   Activity Tolerance Patient limited by fatigue;Treatment limited secondary to medical complications (Comment)   Medical Staff Made Aware NurseYun Aqq  192 yes   Exercises   Balance training  Education given on AP, QS, with return demo  Ed on heel slides and hip abd and adduction  Verbalizes undertanding  Equipment Use   Comments session limtied to pt education about importance of mobility, progression of strength  Also on several LE ther ex that can be performed supine for Le strengthening  Assessment   Prognosis Fair   Problem List Decreased strength;Decreased endurance; Impaired balance;Decreased mobility; Decreased cognition; Impaired judgement;Decreased safety awareness   Assessment Seen for progression of PT  Session limited 2* desire to participate  Reports fatigue and OOB x 2 today with nsg, just returning to bed prior to PT  Sats on 3L range 83-90% wiht conversation  Encouraged proper breathing  Education given about benefits of PT and importance of progressive mobiltiy    Receptive to few bedlevel exercise with return demo, however declines cotinuation of full LE ther ex program at this time  Will attempt to follow and progress as able  Cont to rec return to SNF upon d/c  Goals   Patient Goals go back to [facility] Expiration Date 08/09/18   Treatment Day 1   Plan   Treatment/Interventions Functional transfer training;LE strengthening/ROM; Therapeutic exercise; Endurance training;Patient/family training;Equipment eval/education; Bed mobility;Gait training; Compensatory technique education;Continued evaluation;Spoke to nursing   Progress Slow progress, decreased activity tolerance   PT Frequency 2-3x/wk   Recommendation   Recommendation (return to [facility])   PT - OK to Discharge Yes  (when medically stable )   Harrison Acosta, PT

## 2018-08-01 VITALS
SYSTOLIC BLOOD PRESSURE: 142 MMHG | DIASTOLIC BLOOD PRESSURE: 82 MMHG | RESPIRATION RATE: 20 BRPM | BODY MASS INDEX: 20.74 KG/M2 | OXYGEN SATURATION: 95 % | HEIGHT: 65 IN | HEART RATE: 70 BPM | WEIGHT: 124.5 LBS | TEMPERATURE: 97.2 F

## 2018-08-01 LAB
BASOPHILS # BLD AUTO: 0.03 THOUSANDS/ΜL (ref 0–0.1)
BASOPHILS NFR BLD AUTO: 0 % (ref 0–1)
EOSINOPHIL # BLD AUTO: 0.31 THOUSAND/ΜL (ref 0–0.61)
EOSINOPHIL NFR BLD AUTO: 2 % (ref 0–6)
ERYTHROCYTE [DISTWIDTH] IN BLOOD BY AUTOMATED COUNT: 13.9 % (ref 11.6–15.1)
HCT VFR BLD AUTO: 26.2 % (ref 34.8–46.1)
HGB BLD-MCNC: 8.4 G/DL (ref 11.5–15.4)
LYMPHOCYTES # BLD AUTO: 1.91 THOUSANDS/ΜL (ref 0.6–4.47)
LYMPHOCYTES NFR BLD AUTO: 14 % (ref 14–44)
MCH RBC QN AUTO: 29.9 PG (ref 26.8–34.3)
MCHC RBC AUTO-ENTMCNC: 32.1 G/DL (ref 31.4–37.4)
MCV RBC AUTO: 93 FL (ref 82–98)
MONOCYTES # BLD AUTO: 1.06 THOUSAND/ΜL (ref 0.17–1.22)
MONOCYTES NFR BLD AUTO: 8 % (ref 4–12)
NEUTROPHILS # BLD AUTO: 10.69 THOUSANDS/ΜL (ref 1.85–7.62)
NEUTS SEG NFR BLD AUTO: 76 % (ref 43–75)
NRBC BLD AUTO-RTO: 0 /100 WBCS
PLATELET # BLD AUTO: 559 THOUSANDS/UL (ref 149–390)
PMV BLD AUTO: 9.2 FL (ref 8.9–12.7)
RBC # BLD AUTO: 2.81 MILLION/UL (ref 3.81–5.12)
WBC # BLD AUTO: 14 THOUSAND/UL (ref 4.31–10.16)

## 2018-08-01 PROCEDURE — 85025 COMPLETE CBC W/AUTO DIFF WBC: CPT | Performed by: HOSPITALIST

## 2018-08-01 PROCEDURE — 99239 HOSP IP/OBS DSCHRG MGMT >30: CPT | Performed by: HOSPITALIST

## 2018-08-01 RX ORDER — AMOXICILLIN AND CLAVULANATE POTASSIUM 875; 125 MG/1; MG/1
1 TABLET, FILM COATED ORAL EVERY 12 HOURS SCHEDULED
Qty: 8 TABLET | Refills: 0 | Status: SHIPPED | OUTPATIENT
Start: 2018-08-01 | End: 2018-08-05

## 2018-08-01 RX ADMIN — CEFEPIME HYDROCHLORIDE 1000 MG: 1 INJECTION, SOLUTION INTRAVENOUS at 00:42

## 2018-08-01 RX ADMIN — Medication 25 MG: at 08:30

## 2018-08-01 RX ADMIN — PYRIDOSTIGMINE BROMIDE 30 MG: 60 TABLET ORAL at 08:37

## 2018-08-01 RX ADMIN — LEVETIRACETAM 750 MG: 500 TABLET ORAL at 05:13

## 2018-08-01 RX ADMIN — FOLIC ACID 1 MG: 1 TABLET ORAL at 08:31

## 2018-08-01 RX ADMIN — BENZONATATE 100 MG: 100 CAPSULE ORAL at 05:13

## 2018-08-01 RX ADMIN — LISINOPRIL 5 MG: 5 TABLET ORAL at 08:31

## 2018-08-01 RX ADMIN — BUDESONIDE AND FORMOTEROL FUMARATE DIHYDRATE 2 PUFF: 160; 4.5 AEROSOL RESPIRATORY (INHALATION) at 08:36

## 2018-08-01 RX ADMIN — Medication 100 MG: at 08:31

## 2018-08-01 RX ADMIN — TIOTROPIUM BROMIDE 18 MCG: 18 CAPSULE ORAL; RESPIRATORY (INHALATION) at 08:36

## 2018-08-01 RX ADMIN — CEFEPIME HYDROCHLORIDE 1000 MG: 1 INJECTION, SOLUTION INTRAVENOUS at 12:52

## 2018-08-01 RX ADMIN — OXYBUTYNIN CHLORIDE 5 MG: 10 TABLET, EXTENDED RELEASE ORAL at 08:31

## 2018-08-01 NOTE — SOCIAL WORK
Call dtr again and still unable to get in touch with dtr about pt returning to [place] at 1400 today  Will try again

## 2018-08-01 NOTE — SOCIAL WORK
Called bernice Chase again and was able to leave voice mail that pt would be returning to [facility] today at 1400

## 2018-08-01 NOTE — DISCHARGE SUMMARY
Discharge- Argenis Palencia 1945, 74 y o  female MRN: [id number]    Unit/Bed#: E4 -01 Encounter: [id number]    Primary Care Provider: Jose Neely MD   Date and time admitted to hospital: 7/29/2018 12:22 PM        * Sepsis due to pneumonia [facility])   Assessment & Plan    Doing well today  Back to baseline breathing  Will send back to SNF on a course of Augmentin          HCAP (healthcare-associated pneumonia)   Assessment & Plan    As above          Chronic respiratory failure with hypoxia (HCC)   Assessment & Plan    Baseline is 3 liters oxygen  This is due to COPD            Discharging Physician / Practitioner: Yuly Carpio DO  PCP: Jose Neely MD  Admission Date:   Admission Orders     Ordered        07/29/18 1347  Inpatient Admission (expected length of stay for this patient is greater than two midnights)  Once             Discharge Date: 08/01/18    Resolved Problems  Date Reviewed: 7/31/2018    None                Reason for Admission: shortness of breath      Hospital Course: Argenis Palencia is a 74 y o  female patient who originally presented to the hospital on 7/29/2018 due to shortness of breath and fever  Patient was found to have sepsis due to health care associated pneumonia  She failed outpt treatment with Levaquin  She steadily improved on Cefepime and Vancomycin  I will send her to SNF on a course of Augmentin    Please see above list of diagnoses and related plan for additional information         Condition at Discharge: good       Discharge Day Visit / Exam:     Subjective:  Doing well  Breathing is back to normal  No cough      Vitals: Blood Pressure: 142/82 (08/01/18 0757)  Pulse: 70 (08/01/18 0757)  Temperature: (!) 97 2 °F (36 2 °C) (08/01/18 0757)  Temp Source: Temporal (08/01/18 0757)  Respirations: 20 (08/01/18 0757)  Height: 5' 5" (165 1 cm) (07/29/18 1500)  Weight - Scale: 56 5 kg (124 lb 8 oz) (07/29/18 1227)  SpO2: 95 % (08/01/18 0757)    Exam:     Physical Exam   HENT:   Head: Normocephalic and atraumatic  Eyes: EOM are normal  Pupils are equal, round, and reactive to light  Cardiovascular: Normal rate and regular rhythm  Exam reveals no gallop and no friction rub  No murmur heard  Pulmonary/Chest: Effort normal and breath sounds normal  She has no wheezes  She has no rales  Abdominal: Soft  There is no tenderness  Musculoskeletal: She exhibits no edema  Nursing note and vitals reviewed            Discharge instructions/Information to patient and family:   See after visit summary for information provided to patient and family  Provisions for Follow-Up Care:  See after visit summary for information related to follow-up care and any pertinent home health orders  Disposition:     [facility] [id number] (see below)       Discharge Statement:  I spent 40 minutes discharging the patient  This time was spent on the day of discharge  I had direct contact with the patient on the day of discharge  Greater than 50% of the total time was spent examining patient, answering all patient questions, arranging and discussing plan of care with patient as well as directly providing post-discharge instructions  Additional time then spent on discharge activities  Discharge Medications:  See after visit summary for reconciled discharge medications provided to patient and family        ** Please Note: This note has been constructed using a voice recognition system **

## 2018-08-01 NOTE — SOCIAL WORK
MD has discharge pt back to [facility] and they can accept pt back  Mil CHRISS will  pt at 1400  Medical Necessity form completed, copy in binder and copy in bin  US aware of chart copy  MD, RN, SNF, US, and patient aware of  time  TC to dtr Rena and phone rang and rang  No answering machine  CM will f/u with dtr

## 2018-08-01 NOTE — PLAN OF CARE
Problem: Potential for Falls  Goal: Patient will remain free of falls  INTERVENTIONS:  - Assess patient frequently for physical needs  -  Identify cognitive and physical deficits and behaviors that affect risk of falls    -  [place] fall precautions as indicated by assessment   - Educate patient/family on patient safety including physical limitations  - Instruct patient to call for assistance with activity based on assessment  - Modify environment to reduce risk of injury  - Consider OT/PT consult to assist with strengthening/mobility    Outcome: Progressing      Problem: Prexisting or High Potential for Compromised Skin Integrity  Goal: Skin integrity is maintained or improved  INTERVENTIONS:  - Identify patients at risk for skin breakdown  - Assess and monitor skin integrity  - Assess and monitor nutrition and hydration status  - Monitor labs (i e  albumin)  - Assess for incontinence   - Turn and reposition patient  - Assist with mobility/ambulation  - Relieve pressure over bony prominences  - Avoid friction and shearing  - Provide appropriate hygiene as needed including keeping skin clean and dry  - Evaluate need for skin moisturizer/barrier cream  - Collaborate with interdisciplinary team (i e  Nutrition, Rehabilitation, etc )   - Patient/family teaching   Outcome: Progressing      Problem: PAIN - ADULT  Goal: Verbalizes/displays adequate comfort level or baseline comfort level  Interventions:  - Encourage patient to monitor pain and request assistance  - Assess pain using appropriate pain scale  - Administer analgesics based on type and severity of pain and evaluate response  - Implement non-pharmacological measures as appropriate and evaluate response  - Consider cultural and social influences on pain and pain management  - Notify physician/advanced practitioner if interventions unsuccessful or patient reports new pain   Outcome: Progressing      Problem: INFECTION - ADULT  Goal: Absence or prevention of progression during hospitalization  INTERVENTIONS:  - Assess and monitor for signs and symptoms of infection  - Monitor lab/diagnostic results  - Monitor all insertion sites, i e  indwelling lines, tubes, and drains  - Monitor endotracheal (as able) and nasal secretions for changes in amount and color  - [place] appropriate cooling/warming therapies per order  - Administer medications as ordered  - Instruct and encourage patient and family to use good hand hygiene technique  - Identify and instruct in appropriate isolation precautions for identified infection/condition   Outcome: Progressing      Problem: SAFETY ADULT  Goal: Maintain or return to baseline ADL function  INTERVENTIONS:  -  Assess patient's ability to carry out ADLs; assess patient's baseline for ADL function and identify physical deficits which impact ability to perform ADLs (bathing, care of mouth/teeth, toileting, grooming, dressing, etc )  - Assess/evaluate cause of self-care deficits   - Assess range of motion  - Assess patient's mobility; develop plan if impaired  - Assess patient's need for assistive devices and provide as appropriate  - Encourage maximum independence but intervene and supervise when necessary  ¯ Involve family in performance of ADLs  ¯ Assess for home care needs following discharge   ¯ Request OT consult to assist with ADL evaluation and planning for discharge  ¯ Provide patient education as appropriate   Outcome: Progressing    Goal: Maintain or return mobility status to optimal level  INTERVENTIONS:  - Assess patient's baseline mobility status (ambulation, transfers, stairs, etc )    - Identify cognitive and physical deficits and behaviors that affect mobility  - Identify mobility aids required to assist with transfers and/or ambulation (gait belt, sit-to-stand, lift, walker, cane, etc )  - [place] fall precautions as indicated by assessment  - Record patient progress and toleration of activity level on Mobility SBAR; progress patient to next Phase/Stage  - Instruct patient to call for assistance with activity based on assessment  - Request Rehabilitation consult to assist with strengthening/weightbearing, etc    Outcome: Progressing    Goal: Patient will remain free of falls  INTERVENTIONS:  - Assess patient frequently for physical needs  -  Identify cognitive and physical deficits and behaviors that affect risk of falls  -  [place] fall precautions as indicated by assessment   - Educate patient/family on patient safety including physical limitations  - Instruct patient to call for assistance with activity based on assessment  - Modify environment to reduce risk of injury  - Consider OT/PT consult to assist with strengthening/mobility    Outcome: Progressing      Problem: DISCHARGE PLANNING  Goal: Discharge to home or other facility with appropriate resources  INTERVENTIONS:  - Identify barriers to discharge w/patient and caregiver  - Arrange for needed discharge resources and transportation as appropriate  - Identify discharge learning needs (meds, wound care, etc )  - Arrange for interpretive services to assist at discharge as needed  - Refer to Case Management Department for coordinating discharge planning if the patient needs post-hospital services based on physician/advanced practitioner order or complex needs related to functional status, cognitive ability, or social support system   Outcome: Progressing      Problem: Knowledge Deficit  Goal: Patient/family/caregiver demonstrates understanding of disease process, treatment plan, medications, and discharge instructions  Complete learning assessment and assess knowledge base    Interventions:  - Provide teaching at level of understanding  - Provide teaching via preferred learning methods   Outcome: Progressing      Problem: DISCHARGE PLANNING - CARE MANAGEMENT  Goal: Discharge to post-acute care or home with appropriate resources  INTERVENTIONS:  - Conduct assessment to determine patient/family and health care team treatment goals, and need for post-acute services based on payer coverage, community resources, and patient preferences, and barriers to discharge  - Address psychosocial, clinical, and financial barriers to discharge as identified in assessment in conjunction with the patient/family and health care team  - Arrange appropriate level of post-acute services according to patients   needs and preference and payer coverage in collaboration with the physician and health care team  - Communicate with and update the patient/family, physician, and health care team regarding progress on the discharge plan  - Arrange appropriate transportation to post-acute venues   Outcome: Progressing

## 2018-08-01 NOTE — PLAN OF CARE
INFECTION - ADULT     Absence of fever/infection during neutropenic period Completed          DISCHARGE PLANNING     Discharge to home or other facility with appropriate resources Progressing        DISCHARGE PLANNING - CARE MANAGEMENT     Discharge to post-acute care or home with appropriate resources Progressing        INFECTION - ADULT     Absence or prevention of progression during hospitalization Progressing        Knowledge Deficit     Patient/family/caregiver demonstrates understanding of disease process, treatment plan, medications, and discharge instructions Progressing        PAIN - ADULT     Verbalizes/displays adequate comfort level or baseline comfort level Progressing        Potential for Falls     Patient will remain free of falls Progressing        Prexisting or High Potential for Compromised Skin Integrity     Skin integrity is maintained or improved Progressing        SAFETY ADULT     Maintain or return to baseline ADL function Progressing     Maintain or return mobility status to optimal level Progressing     Patient will remain free of falls Progressing

## 2018-08-01 NOTE — PROGRESS NOTES
Addendum      A/P  1  Hyponatremia  Patient had hyponatremia present on admission     This was due to sepsis and resolved with treatment of sepsis/pneumonia

## 2018-08-03 LAB
BACTERIA BLD CULT: NORMAL
BACTERIA BLD CULT: NORMAL